# Patient Record
Sex: FEMALE | Race: WHITE | NOT HISPANIC OR LATINO | Employment: UNEMPLOYED | URBAN - METROPOLITAN AREA
[De-identification: names, ages, dates, MRNs, and addresses within clinical notes are randomized per-mention and may not be internally consistent; named-entity substitution may affect disease eponyms.]

---

## 2018-05-07 DIAGNOSIS — Z96.649 STATUS POST REVISION OF TOTAL HIP REPLACEMENT: Primary | ICD-10-CM

## 2018-05-07 NOTE — TELEPHONE ENCOUNTER
FUTURE VISIT INFORMATION      FUTURE VISIT INFORMATION:    Date: 5/10/18    Time: 1315    Location: ORTHO  REFERRAL INFORMATION:    Referring provider:  SELF    Referring providers clinic:  NONE    Reason for visit/diagnosis  L HIP    RECORDS RECEIVED FROM: INTERNAL   DATE RECEIVED: 5/7/18   NOTES STATUS DETAILS   OFFICE NOTE from referring provider N/A    OFFICE NOTE from other specialist Internal 6/5/14*8/5/13*6/10/13*11/19/12*   DISCHARGE SUMMARY from hospital N/A    DISCHARGE REPORT from the ER N/A    OPERATIVE REPORT Internal    MEDICATION LIST Internal    IMPLANT RECORD/STICKER Internal    LABS     CBC/DIFF Internal    CULTURES N/A    INJECTIONS DONE IN RADIOLOGY N/A    MRI N/A    CT SCAN N/A    XRAYS (IMAGES & REPORTS) Internal 6/5/14*8/5/13*6/11/13*2/17/13*   /TUMOR     PATHOLOGY  Slides & report N/A

## 2018-05-10 ENCOUNTER — PRE VISIT (OUTPATIENT)
Dept: ORTHOPEDICS | Facility: CLINIC | Age: 48
End: 2018-05-10

## 2018-05-10 ENCOUNTER — RADIANT APPOINTMENT (OUTPATIENT)
Dept: GENERAL RADIOLOGY | Facility: CLINIC | Age: 48
End: 2018-05-10
Attending: ORTHOPAEDIC SURGERY
Payer: COMMERCIAL

## 2018-05-10 ENCOUNTER — OFFICE VISIT (OUTPATIENT)
Dept: ORTHOPEDICS | Facility: CLINIC | Age: 48
End: 2018-05-10
Payer: COMMERCIAL

## 2018-05-10 VITALS — HEIGHT: 67 IN | BODY MASS INDEX: 31.61 KG/M2 | WEIGHT: 201.4 LBS

## 2018-05-10 DIAGNOSIS — Z96.649 STATUS POST REVISION OF TOTAL HIP REPLACEMENT: ICD-10-CM

## 2018-05-10 DIAGNOSIS — M93.001 SLIPPED PROXIMAL FEMORAL EPIPHYSIS OF RIGHT HIP: ICD-10-CM

## 2018-05-10 DIAGNOSIS — S73.005S DISLOCATION OF LEFT HIP, SEQUELA: Primary | ICD-10-CM

## 2018-05-10 RX ORDER — HYDROCORTISONE 2.5 %
CREAM (GRAM) TOPICAL
COMMUNITY
Start: 2018-03-07

## 2018-05-10 RX ORDER — BUPROPION HYDROCHLORIDE 150 MG/1
150 TABLET ORAL
COMMUNITY
Start: 2018-03-07

## 2018-05-10 ASSESSMENT — ACTIVITIES OF DAILY LIVING (ADL)
ADL_MEAN: 1.05
ADL_SUM: 18
ADL_SUBSCALE_SCORE: 73.52

## 2018-05-10 ASSESSMENT — ENCOUNTER SYMPTOMS
DEPRESSION: 1
MUSCLE WEAKNESS: 0
PANIC: 0
JOINT SWELLING: 1
NERVOUS/ANXIOUS: 0
MUSCLE CRAMPS: 1
DECREASED CONCENTRATION: 0
NECK PAIN: 1
ARTHRALGIAS: 1
STIFFNESS: 1
MYALGIAS: 1
BACK PAIN: 1
INSOMNIA: 1

## 2018-05-10 ASSESSMENT — HOOS S4: HOW SEVERE IS YOUR HIP JOINT STIFFNESS AFTER FIRST WAKENING IN THE MORNING?: MILD

## 2018-05-10 NOTE — PROGRESS NOTES
Orthopaedic Oncology and Adult Reconstructive (joint replacement) Surgery   Clinic visit -  Glen Waldron M.D.    DX:   1. SCFE Bilateral femoral heads   2. Recurrent dislocation L ERLINDA    SURGERIES:   1. 1988, L prox femoral osteotomy, Dr. Jose Rafael Seay. [facility]  2. 1989, Removal of hardware  3. 1992, 2 level lumbar fusion, DR. Mccall  4. 1993, Removal hardware L spine  5. 11/10/1995, L ERLINDA, Dr. Seay, [facility]  6. 4/20/2011, R knee scope, Dr. Miguel Ángel Martin  7. 10/7/2012, 10/16/12, 10/25/12 L Hip dislocation. CLosed REductions  8. 11/7/2012, negative ultrasound Bilateral LE's  9. 6/11/2013, Revision ERLINDA for recurrent dislocation. IMPLANTS USED: Biomet size 24, 32-mm inner diameter high wall vitamin E impregnated acetabular liner for size 56-mm outer diameter cup. Biolox Zirconia Delta option ceramic head, 32 mm diameter with type 16/18 taper with type 12/14 taper adapter titanium sleeve. For reference purposes, size 9 femoral stem porous ingrowth Biomet Bi-Metric device was implanted with a type 12/14 Levine taper. Intraop cultures (-).    HISTORY OF PRESENT ILLNESS: Mago Haney returns to see me and I last saw her approximately 4-5 years ago for evaluation of her revised left hip arthroplasty.  She has been under the care of Dr. Miguel Ángel Martin and is considering a right hip or right knee replacement.  She reports that she is doing well with respect to her left hip.  She has lost over 100 pounds since I last saw her after she underwent a gastric sleeve bariatric procedure.     PHYSICAL EXAMINATION: She is independently ambulatory.  There is no limp.  She has a pain-free range of motion of both hips.  There is a leg length discrepancy being shorter on the right side.      IMAGING:  We reviewed x-rays obtained in our office today which shows well seated implants with no evidence of loosening or subsidence.  No bearing wear is appreciated.  A deformed right femoral head is noted in part related to her prior slipped  capital femoral epiphyses.  A short neck and a varus position is noted.     PLAN: I believe her outcome is excellent considering the problems she is experiencing.  We have been successful in alleviating the recurrent dislocation and was occurring her left hip joint.  She had questions about whether or not to proceed the right hip or knee replacement. I do not have right knee films available for review.  However I indicated that sometimes diagnostic lidocaine injections into the knee will help elucidate what component of her pain is related to knee arthritis as opposed to what component of pain is originating from the hip joint.  I suggested she bring this up with Dr. Martin.     Next follow-up visit in 5 years.    MD Azar James Family Professor  Oncology and Adult Reconstructive Surgery  Dept Orthopaedic Surgery, [facility]  [phone] office, [phone] pager  [email or web address]    Total Time = 25 min, 50% of which was spent in counseling and coordination of care as documented above.        Questionnaires:  HOOS Hip Dysfunction & Osteoarthritis Outcome Questionnaire     Hip Dysfunction & Osteoarthritis Outcome Score (HOOS), English Version LK 2.0 (Lina PAYTON, Attila E, Brenna CISNEROS., 2003) 5/10/2018   S1. Do you feel grinding, hear clicking or any other type of noise from your hip? Never   S2. Difficulties spreading legs wide apart Mild   S3. Difficulties to stride out when walking Mild   S4. How severe is your hip joint stiffness after first wakening in the morning? Mild   S5. How severe is your hip stiffness after sitting, lying or resting LATER IN THE DAY? Mild   Symptom Count 5   Symptom Sum 4   Symptom Mean 0.8   Symptom Subscale Score 80   P1. How often is your hip painful? Monthly   P2. Straightening your hip fully None   P3. Bending your hip FULLY None   P4. Walking on a flat surface None   P5. Going up or down stairs Mild   P6. At night while in bed None   P7. Sitting or lying None    P8. Standing upright None   P9. Walking on a hard surface (asphalt, concrete, etc.) Mild   P10. Walking on an uneven surface Moderate   Pain Count 10   Pain Sum 5   Pain Mean 0.5   Pain Subscale Score 87.5   A1. Descending stairs Mild   A2. Ascending stairs Mild   A3. Rising from sitting Mild   A4. Standing Mild   A5. Bending to the floor/ an object Mild   A6. Walking on a flat surface Mild   A7. Getting in/out of car Mild   A8. Going shopping Mild   A9. Putting on socks/stockings Mild   A10. Rising from bed Mild   A11. Taking off socks/stockings Mild   A12. Lying in bed (turning over, maintaining hip position) Mild   A13. Getting in/out of bed Mild   A14. Sitting Mild   A15. Getting on/off toilet Mild   A16. Heavy domestic duties (moving heavy boxes, scrubbing floors, etc.) Moderate   A17. Light domestic duties (cooking, dusting, etc.) Mild   ADL Count 17   ADL Sum 18   ADL Mean 1.05   ADL Subscale Score 73.52   SP1. Squatting Severe   SP2. Running Severe   SP3. Twisting/pivoting on loaded leg Severe   SP4. Walking on uneven surface Moderate   Sports/Rec Count 4   Sports/Rec Sum 11   Sports/Rec Mean 2.75   Sports/Rec Subscale Score 31.25   Q1. How often are you aware of your hip problem? Weekly   Q2. Have you modified you life style to avoid activities potentially damaging to your hip? Moderately   Q3. How much are you troubled with lack of confidence in your hip? Not at all   Q4. In general, how much difficulty do you have with your hip? Mild   QOL Count 4   QOL Sum 5   QOL Mean 1.25   Quality of Life Subscale Score 68.75            Promis 10 Assessment     PROMIS 10 5/10/2018   In general, would you say your health is: Good   In general, would you say your quality of life is: Good   In general, how would you rate your physical health? Fair   In general, how would you rate your mental health, including your mood and your ability to think? Good   In general, how would you rate your satisfaction with your  social activities and relationships? Good   In general, please rate how well you carry out your usual social activities and roles Good   To what extent are you able to carry out your everyday physical activities such as walking, climbing stairs, carrying groceries, or moving a chair? Moderately   How often have you been bothered by emotional problems such as feeling anxious, depressed or irritable? Sometimes   How would you rate your fatigue on average? Moderate   How would you rate your pain on average?   0 = No Pain  to  10 = Worst Imaginable Pain 7   In general, would you say your health is: 3   In general, would you say your quality of life is: 3   In general, how would you rate your physical health? 2   In general, how would you rate your mental health, including your mood and your ability to think? 3   In general, how would you rate your satisfaction with your social activities and relationships? 3   In general, please rate how well you carry out your usual social activities and roles. (This includes activities at home, at work and in your community, and responsibilities as a parent, child, spouse, employee, friend, etc.) 3   To what extent are you able to carry out your everyday physical activities such as walking, climbing stairs, carrying groceries, or moving a chair? 3   In the past 7 days, how often have you been bothered by emotional problems such as feeling anxious, depressed, or irritable? 3   In the past 7 days, how would you rate your fatigue on average? 3   In the past 7 days, how would you rate your pain on average, where 0 means no pain, and 10 means worst imaginable pain? 7   Global Mental Health Score 12   Global Physical Health Score 10   PROMIS TOTAL - SUBSCORES 22       Answers for HPI/ROS submitted by the patient on 5/10/2018   General Symptoms: No  Skin Symptoms: No  HENT Symptoms: No  EYE SYMPTOMS: No  HEART SYMPTOMS: No  LUNG SYMPTOMS: No  INTESTINAL SYMPTOMS: No  URINARY SYMPTOMS:  No  GYNECOLOGIC SYMPTOMS: No  BREAST SYMPTOMS: No  SKELETAL SYMPTOMS: Yes  BLOOD SYMPTOMS: No  NERVOUS SYSTEM SYMPTOMS: No  MENTAL HEALTH SYMPTOMS: Yes  Back pain: Yes  Muscle aches: Yes  Neck pain: Yes  Swollen joints: Yes  Joint pain: Yes  Bone pain: Yes  Muscle cramps: Yes  Muscle weakness: No  Joint stiffness: Yes  Bone fracture: No  Nervous or Anxious: No  Depression: Yes  Trouble sleeping: Yes  Trouble thinking or concentrating: No  Mood changes: No  Panic attacks: No

## 2018-05-10 NOTE — NURSING NOTE
"Chief Complaint   Patient presents with     Surgical Followup     5yr POP F/u Left Hip Exam Under Anesthesia DOS: 06/11/2013.        48 year old  1970    Ht 1.689 m (5' 6.5\")  Wt 91.4 kg (201 lb 6.4 oz)  BMI 32.02 kg/m2        [id number] - [place], [place] - [address]    Allergies   Allergen Reactions     Codeine Phosphate Nausea     Tylox [Oxycodone-Acetaminophen] Nausea     Prevacid      Caused ulcerative colitis     Erythromycin      Yeast infection     Morphine Sulfate      Not effective     Current Outpatient Prescriptions   Medication     ACETAMINOPHEN PO     albuterol (PROVENTIL HFA: VENTOLIN HFA) 108 (90 BASE) MCG/ACT inhaler     buPROPion (WELLBUTRIN XL) 150 MG 24 hr tablet     calcium carbonate-vitamin D 500-400 MG-UNIT TABS per tablet     cholecalciferol 1000 UNITS TABS     Cyanocobalamin 5000 MCG SUBL     diphenhydrAMINE (BENADRYL) 25 MG capsule     DULoxetine (CYMBALTA) 30 MG capsule     HYDROcodone-acetaminophen (NORCO) 5-325 MG per tablet     hydrocortisone 2.5 % cream     levonorgestrel (MIRENA, 52 MG,) 20 MCG/24HR IUD     LISINOPRIL PO     mometasone-formoterol (DULERA) 100-5 MCG/ACT oral inhaler     Multiple Vitamins-Minerals (CENTRUM SILVER) per tablet     Multiple Vitamins-Minerals (MULTIVITAMINS PLUS ZINC PO)     norethindrone (KIRSTEN) 0.35 MG tablet     orphenadrine (NORFLEX) 100 MG tablet     oxaprozin (DAYPRO) 600 MG tablet     oxybutynin (DITROPAN) 5 MG tablet     Pantoprazole Sodium (PROTONIX PO)     polyethylene glycol (MIRALAX/GLYCOLAX) packet     SIMVASTATIN PO     traZODone (DESYREL) 50 MG tablet     No current facility-administered medications for this visit.      Facility-Administered Medications Ordered in Other Visits   Medication     lidocaine 0.5 % injection         Questionnaires:  HOOS Hip Dysfunction & Osteoarthritis Outcome Questionnaire    Hip Dysfunction & Osteoarthritis Outcome Score (HOOS), English Version LK 2.0 (Attila Ramos Mannevik E., " 2003) 5/10/2018   S1. Do you feel grinding, hear clicking or any other type of noise from your hip? Never   S2. Difficulties spreading legs wide apart Mild   S3. Difficulties to stride out when walking Mild   S4. How severe is your hip joint stiffness after first wakening in the morning? Mild   S5. How severe is your hip stiffness after sitting, lying or resting LATER IN THE DAY? Mild   Symptom Count 5   Symptom Sum 4   Symptom Mean 0.8   Symptom Subscale Score 80   P1. How often is your hip painful? Monthly   P2. Straightening your hip fully None   P3. Bending your hip FULLY None   P4. Walking on a flat surface None   P5. Going up or down stairs Mild   P6. At night while in bed None   P7. Sitting or lying None   P8. Standing upright None   P9. Walking on a hard surface (asphalt, concrete, etc.) Mild   P10. Walking on an uneven surface Moderate   Pain Count 10   Pain Sum 5   Pain Mean 0.5   Pain Subscale Score 87.5   A1. Descending stairs Mild   A2. Ascending stairs Mild   A3. Rising from sitting Mild   A4. Standing Mild   A5. Bending to the floor/ an object Mild   A6. Walking on a flat surface Mild   A7. Getting in/out of car Mild   A8. Going shopping Mild   A9. Putting on socks/stockings Mild   A10. Rising from bed Mild   A11. Taking off socks/stockings Mild   A12. Lying in bed (turning over, maintaining hip position) Mild   A13. Getting in/out of bed Mild   A14. Sitting Mild   A15. Getting on/off toilet Mild   A16. Heavy domestic duties (moving heavy boxes, scrubbing floors, etc.) Moderate   A17. Light domestic duties (cooking, dusting, etc.) Mild   ADL Count 17   ADL Sum 18   ADL Mean 1.05   ADL Subscale Score 73.52   SP1. Squatting Severe   SP2. Running Severe   SP3. Twisting/pivoting on loaded leg Severe   SP4. Walking on uneven surface Moderate   Sports/Rec Count 4   Sports/Rec Sum 11   Sports/Rec Mean 2.75   Sports/Rec Subscale Score 31.25   Q1. How often are you aware of your hip problem? Weekly    Q2. Have you modified you life style to avoid activities potentially damaging to your hip? Moderately   Q3. How much are you troubled with lack of confidence in your hip? Not at all   Q4. In general, how much difficulty do you have with your hip? Mild   QOL Count 4   QOL Sum 5   QOL Mean 1.25   Quality of Life Subscale Score 68.75          Promis 10 Assessment    PROMIS 10 5/10/2018   In general, would you say your health is: Good   In general, would you say your quality of life is: Good   In general, how would you rate your physical health? Fair   In general, how would you rate your mental health, including your mood and your ability to think? Good   In general, how would you rate your satisfaction with your social activities and relationships? Good   In general, please rate how well you carry out your usual social activities and roles Good   To what extent are you able to carry out your everyday physical activities such as walking, climbing stairs, carrying groceries, or moving a chair? Moderately   How often have you been bothered by emotional problems such as feeling anxious, depressed or irritable? Sometimes   How would you rate your fatigue on average? Moderate   How would you rate your pain on average?   0 = No Pain  to  10 = Worst Imaginable Pain 7   In general, would you say your health is: 3   In general, would you say your quality of life is: 3   In general, how would you rate your physical health? 2   In general, how would you rate your mental health, including your mood and your ability to think? 3   In general, how would you rate your satisfaction with your social activities and relationships? 3   In general, please rate how well you carry out your usual social activities and roles. (This includes activities at home, at work and in your community, and responsibilities as a parent, child, spouse, employee, friend, etc.) 3   To what extent are you able to carry out your everyday physical activities  such as walking, climbing stairs, carrying groceries, or moving a chair? 3   In the past 7 days, how often have you been bothered by emotional problems such as feeling anxious, depressed, or irritable? 3   In the past 7 days, how would you rate your fatigue on average? 3   In the past 7 days, how would you rate your pain on average, where 0 means no pain, and 10 means worst imaginable pain? 7   Global Mental Health Score 12   Global Physical Health Score 10   PROMIS TOTAL - SUBSCORES 22   Some recent data might be hidden

## 2018-05-10 NOTE — MR AVS SNAPSHOT
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 With Smallknot, you can request a clinic appointment, read your test results, renew a prescription or communicate with your care team.     To sign up for Smallknot visit the website at [email or web address]   You will be asked to enter the access code listed below, as well as some personal information. Please follow the directions to create your username and password.     Your access code is: [email or web address]  Expires: 2018  6:30 AM     Your access code will  in 90 days. If you need help or a new code, please contact your [facility] or call [phone] for assistance.        Care EveryWhere ID     This is your Care EveryWhere ID. This could be used by other organizations to access your Left Hand medical records  [id number]        Your Vitals Were     Height BMI (Body Mass Index)                1.689 m (5' 6.5\") 32.02 kg/m2           Blood Pressure from Last 3 Encounters:   06/15/13 123/63   12 124/54    Weight from Last 3 Encounters:   05/10/18 91.4 kg (201 lb 6.4 oz)   14 102.1 kg (225 lb)   13 (!) 141.3 kg (311 lb 8 oz)              Today, " you had the following     No orders found for display       Primary Care Provider Office Phone # Fax #    Marley Barragan [phone]       [facility] [address] [address]        Equal Access to Services     WALLY KELLY : Hadii josiane crews sachao Soedvinali, waaxda luqadaha, qaybta kaalmada adeegyada, burt dianain hayaagennaro qureshi sylviacornelius garcia. So [facility] [phone].    ATENCIÓN: Si habla español, tiene a davis disposición servicios gratuitos de asistencia lingüística. Llame al [phone].    We comply with applicable federal civil rights laws and [place] laws. We do not discriminate on the basis of race, color, national origin, age, disability, sex, sexual orientation, or gender identity.            Thank you!     Thank you for choosing [facility]  for your care. Our goal is always to provide you with excellent care. Hearing back from our patients is one way we can continue to improve our services. Please take a few minutes to complete the written survey that you may receive in the mail after your visit with us. Thank you!             Your Updated Medication List - Protect others around you: Learn how to safely use, store and throw away your medicines at www.disposemymeds.org.          This list is accurate as of 5/10/18  2:25 PM.  Always use your most recent med list.                   Brand Name Dispense Instructions for use Diagnosis    ACETAMINOPHEN PO      Take 500 mg by mouth every 6 hours as needed.        albuterol 108 (90 Base) MCG/ACT Inhaler    PROAIR HFA/PROVENTIL HFA/VENTOLIN HFA     Inhale 2 puffs into the lungs every 6 hours as needed.        BENADRYL 25 MG capsule   Generic drug:  diphenhydrAMINE      Take 25 mg by mouth 2 times daily.        buPROPion 150 MG 24 hr tablet    WELLBUTRIN XL     Take 150 mg by mouth        calcium carbonate-vitamin D 500-400 MG-UNIT Tabs per tablet           KIRSTEN 0.35 MG per tablet   Generic drug:   norethindrone      Take 1 tablet by mouth daily.        * CENTRUM SILVER per tablet      Take 1 tablet by mouth daily.        * MULTIVITAMINS PLUS ZINC PO           cholecalciferol 1000 units Tabs     100 tablet    Take 1,000 Units by mouth daily.    Hip pain, left, Subluxation of prosthetic hip (H)       Cyanocobalamin 5000 MCG Subl           CYMBALTA 30 MG EC capsule   Generic drug:  DULoxetine      Take 30 mg by mouth 2 times daily        HYDROcodone-acetaminophen 5-325 MG per tablet    NORCO     Take 1 tablet by mouth every 6 hours as needed for moderate to severe pain        hydrocortisone 2.5 % cream           LISINOPRIL PO      Take 20 mg by mouth daily.        MIRENA (52 MG) 20 MCG/24HR IUD   Generic drug:  levonorgestrel           mometasone-formoterol 100-5 MCG/ACT oral inhaler    DULERA     Inhale 2 puffs into the lungs        orphenadrine 100 MG 12 hr tablet    NORFLEX     Take 1 tablet by mouth At Bedtime.    S/P hip replacement       oxaprozin 600 MG tablet    DAYPRO     Take 600 mg by mouth 3 times daily. Two in am, one in pm        oxybutynin 5 MG tablet    DITROPAN    270 tablet    Take 1 tablet by mouth 2 times daily.    GERD (gastroesophageal reflux disease)       polyethylene glycol Packet    MIRALAX/GLYCOLAX    14 each    Take 17 g by mouth daily.    Hip pain, left, Subluxation of prosthetic hip (H)       PROTONIX PO      Take 40 mg by mouth 2 times daily (before meals).        SIMVASTATIN PO      Take 40 mg by mouth At Bedtime.        traZODone 50 MG tablet    DESYREL     Take  by mouth At Bedtime.        * Notice:  This list has 2 medication(s) that are the same as other medications prescribed for you. Read the directions carefully, and ask your doctor or other care provider to review them with you.

## 2018-05-10 NOTE — LETTER
5/10/2018       RE: Mago Danielson  [address]  [facility] [address]     Dear Colleague,    Thank you for referring your patient, Mago Danielson, to the [facility] ORTHOP[facility]ED[facility] at [facility]. Please see a copy of my visit note below.        Orthopaedic Oncology and Adult Reconstructive (joint replacement) Surgery   Clinic visit -  Glne Waldron M.D.    DX:   1. SCFE Bilateral femoral heads   2. Recurrent dislocation L ERLINDA    SURGERIES:   1. 1988, L prox femoral osteotomy, Dr. Jose Rafael Seay. [facility]  2. 1989, Removal of hardware  3. 1992, 2 level lumbar fusion, DR. Mccall  4. 1993, Removal hardware L spine  5. 11/10/1995, L ERLINDA, Dr. Seay, [facility]  6. 4/20/2011, R knee scope, Dr. Miguel Ángel Martin  7. 10/7/2012, 10/16/12, 10/25/12 L Hip dislocation. CLosed REductions  8. 11/7/2012, negative ultrasound Bilateral LE's  9. 6/11/2013, Revision ERLINDA for recurrent dislocation. IMPLANTS USED: Biomet size 24, 32-mm inner diameter high wall vitamin E impregnated acetabular liner for size 56-mm outer diameter cup. Biolox Zirconia Delta option ceramic head, 32 mm diameter with type 16/18 taper with type 12/14 taper adapter titanium sleeve. For reference purposes, size 9 femoral stem porous ingrowth Biomet Bi-Metric device was implanted with a type 12/14 Levine taper. Intraop cultures (-).    HISTORY OF PRESENT ILLNESS: Mago Haney returns to see me and I last saw her approximately 4-5 years ago for evaluation of her revised left hip arthroplasty.  She has been under the care of Dr. Miguel Ángel Martin and is considering a right hip or right knee replacement.  She reports that she is doing well with respect to her left hip.  She has lost over 100 pounds since I last saw her after she underwent a gastric sleeve bariatric procedure.     PHYSICAL EXAMINATION: She is independently ambulatory.  There is no limp.  She has a pain-free range of motion of both hips.  There is a leg length  discrepancy being shorter on the right side.      IMAGING:  We reviewed x-rays obtained in our office today which shows well seated implants with no evidence of loosening or subsidence.  No bearing wear is appreciated.  A deformed right femoral head is noted in part related to her prior slipped capital femoral epiphyses.  A short neck and a varus position is noted.     PLAN: I believe her outcome is excellent considering the problems she is experiencing.  We have been successful in alleviating the recurrent dislocation and was occurring her left hip joint.  She had questions about whether or not to proceed the right hip or knee replacement. I do not have right knee films available for review.  However I indicated that sometimes diagnostic lidocaine injections into the knee will help elucidate what component of her pain is related to knee arthritis as opposed to what component of pain is originating from the hip joint.  I suggested she bring this up with Dr. Martin.     Next follow-up visit in 5 years.    MD Azar James Family Professor  Oncology and Adult Reconstructive Surgery  Dept Orthopaedic Surgery, [facility]  [phone] office, [phone] pager  [email or web address]    Total Time = 25 min, 50% of which was spent in counseling and coordination of care as documented above.        Questionnaires:  HOOS Hip Dysfunction & Osteoarthritis Outcome Questionnaire     Hip Dysfunction & Osteoarthritis Outcome Score (HOOS), English Version LK 2.0 (Attila Ramos E, Brenna CISNEROS., 2003) 5/10/2018   S1. Do you feel grinding, hear clicking or any other type of noise from your hip? Never   S2. Difficulties spreading legs wide apart Mild   S3. Difficulties to stride out when walking Mild   S4. How severe is your hip joint stiffness after first wakening in the morning? Mild   S5. How severe is your hip stiffness after sitting, lying or resting LATER IN THE DAY? Mild   Symptom Count 5   Symptom Sum 4    Symptom Mean 0.8   Symptom Subscale Score 80   P1. How often is your hip painful? Monthly   P2. Straightening your hip fully None   P3. Bending your hip FULLY None   P4. Walking on a flat surface None   P5. Going up or down stairs Mild   P6. At night while in bed None   P7. Sitting or lying None   P8. Standing upright None   P9. Walking on a hard surface (asphalt, concrete, etc.) Mild   P10. Walking on an uneven surface Moderate   Pain Count 10   Pain Sum 5   Pain Mean 0.5   Pain Subscale Score 87.5   A1. Descending stairs Mild   A2. Ascending stairs Mild   A3. Rising from sitting Mild   A4. Standing Mild   A5. Bending to the floor/ an object Mild   A6. Walking on a flat surface Mild   A7. Getting in/out of car Mild   A8. Going shopping Mild   A9. Putting on socks/stockings Mild   A10. Rising from bed Mild   A11. Taking off socks/stockings Mild   A12. Lying in bed (turning over, maintaining hip position) Mild   A13. Getting in/out of bed Mild   A14. Sitting Mild   A15. Getting on/off toilet Mild   A16. Heavy domestic duties (moving heavy boxes, scrubbing floors, etc.) Moderate   A17. Light domestic duties (cooking, dusting, etc.) Mild   ADL Count 17   ADL Sum 18   ADL Mean 1.05   ADL Subscale Score 73.52   SP1. Squatting Severe   SP2. Running Severe   SP3. Twisting/pivoting on loaded leg Severe   SP4. Walking on uneven surface Moderate   Sports/Rec Count 4   Sports/Rec Sum 11   Sports/Rec Mean 2.75   Sports/Rec Subscale Score 31.25   Q1. How often are you aware of your hip problem? Weekly   Q2. Have you modified you life style to avoid activities potentially damaging to your hip? Moderately   Q3. How much are you troubled with lack of confidence in your hip? Not at all   Q4. In general, how much difficulty do you have with your hip? Mild   QOL Count 4   QOL Sum 5   QOL Mean 1.25   Quality of Life Subscale Score 68.75            Promis 10 Assessment     PROMIS 10 5/10/2018   In general, would you say your  health is: Good   In general, would you say your quality of life is: Good   In general, how would you rate your physical health? Fair   In general, how would you rate your mental health, including your mood and your ability to think? Good   In general, how would you rate your satisfaction with your social activities and relationships? Good   In general, please rate how well you carry out your usual social activities and roles Good   To what extent are you able to carry out your everyday physical activities such as walking, climbing stairs, carrying groceries, or moving a chair? Moderately   How often have you been bothered by emotional problems such as feeling anxious, depressed or irritable? Sometimes   How would you rate your fatigue on average? Moderate   How would you rate your pain on average?   0 = No Pain  to  10 = Worst Imaginable Pain 7   In general, would you say your health is: 3   In general, would you say your quality of life is: 3   In general, how would you rate your physical health? 2   In general, how would you rate your mental health, including your mood and your ability to think? 3   In general, how would you rate your satisfaction with your social activities and relationships? 3   In general, please rate how well you carry out your usual social activities and roles. (This includes activities at home, at work and in your community, and responsibilities as a parent, child, spouse, employee, friend, etc.) 3   To what extent are you able to carry out your everyday physical activities such as walking, climbing stairs, carrying groceries, or moving a chair? 3   In the past 7 days, how often have you been bothered by emotional problems such as feeling anxious, depressed, or irritable? 3   In the past 7 days, how would you rate your fatigue on average? 3   In the past 7 days, how would you rate your pain on average, where 0 means no pain, and 10 means worst imaginable pain? 7   Global Mental Health Score  12   Global Physical Health Score 10   PROMIS TOTAL - SUBSCORES 22       Again, thank you for allowing me to participate in the care of your patient.      Sincerely,    Glen Waldron MD

## 2022-01-01 ENCOUNTER — PATIENT OUTREACH (OUTPATIENT)
Dept: CARE COORDINATION | Facility: CLINIC | Age: 52
End: 2022-01-01

## 2022-01-01 ENCOUNTER — VIRTUAL VISIT (OUTPATIENT)
Dept: CONSULT | Facility: CLINIC | Age: 52
End: 2022-01-01
Attending: PSYCHIATRY & NEUROLOGY
Payer: MEDICARE

## 2022-01-01 ENCOUNTER — TELEPHONE (OUTPATIENT)
Dept: NEUROLOGY | Facility: CLINIC | Age: 52
End: 2022-01-01
Payer: MEDICARE

## 2022-01-01 ENCOUNTER — TRANSFERRED RECORDS (OUTPATIENT)
Dept: HEALTH INFORMATION MANAGEMENT | Facility: CLINIC | Age: 52
End: 2022-01-01

## 2022-01-01 ENCOUNTER — TELEPHONE (OUTPATIENT)
Dept: NEUROLOGY | Facility: CLINIC | Age: 52
End: 2022-01-01

## 2022-01-01 ENCOUNTER — OFFICE VISIT (OUTPATIENT)
Dept: NEUROLOGY | Facility: CLINIC | Age: 52
End: 2022-01-01
Payer: MEDICARE

## 2022-01-01 ENCOUNTER — RESEARCH ENCOUNTER (OUTPATIENT)
Dept: NEUROLOGY | Facility: CLINIC | Age: 52
End: 2022-01-01

## 2022-01-01 ENCOUNTER — TRANSCRIBE ORDERS (OUTPATIENT)
Dept: OTHER | Age: 52
End: 2022-01-01
Payer: MEDICARE

## 2022-01-01 ENCOUNTER — DOCUMENTATION ONLY (OUTPATIENT)
Dept: NEUROLOGY | Facility: CLINIC | Age: 52
End: 2022-01-01

## 2022-01-01 ENCOUNTER — HEALTH MAINTENANCE LETTER (OUTPATIENT)
Age: 52
End: 2022-01-01

## 2022-01-01 ENCOUNTER — LAB (OUTPATIENT)
Dept: LAB | Facility: CLINIC | Age: 52
End: 2022-01-01

## 2022-01-01 ENCOUNTER — VIRTUAL VISIT (OUTPATIENT)
Dept: NEUROLOGY | Facility: CLINIC | Age: 52
End: 2022-01-01
Payer: MEDICARE

## 2022-01-01 VITALS
RESPIRATION RATE: 16 BRPM | WEIGHT: 207.5 LBS | BODY MASS INDEX: 32.99 KG/M2 | HEART RATE: 72 BPM | DIASTOLIC BLOOD PRESSURE: 81 MMHG | OXYGEN SATURATION: 96 % | SYSTOLIC BLOOD PRESSURE: 123 MMHG

## 2022-01-01 VITALS
DIASTOLIC BLOOD PRESSURE: 72 MMHG | HEART RATE: 72 BPM | SYSTOLIC BLOOD PRESSURE: 105 MMHG | WEIGHT: 225 LBS | RESPIRATION RATE: 16 BRPM | BODY MASS INDEX: 35.77 KG/M2 | OXYGEN SATURATION: 96 %

## 2022-01-01 DIAGNOSIS — G12.21 ALS (AMYOTROPHIC LATERAL SCLEROSIS) (H): Primary | ICD-10-CM

## 2022-01-01 DIAGNOSIS — R53.1 WEAKNESS: Primary | ICD-10-CM

## 2022-01-01 DIAGNOSIS — G12.21 ALS (AMYOTROPHIC LATERAL SCLEROSIS) (H): ICD-10-CM

## 2022-01-01 DIAGNOSIS — M75.02 ADHESIVE CAPSULITIS OF LEFT SHOULDER: ICD-10-CM

## 2022-01-01 DIAGNOSIS — G12.20 MOTOR NEURON DISEASE (H): ICD-10-CM

## 2022-01-01 LAB
ALBUMIN SERPL-MCNC: 3.9 G/DL (ref 3.4–5)
ALP SERPL-CCNC: 71 U/L (ref 40–150)
ALT SERPL W P-5'-P-CCNC: 16 U/L (ref 0–50)
ALT SERPL-CCNC: 24 U/L (ref 14–63)
AST SERPL W P-5'-P-CCNC: 18 U/L (ref 0–45)
AST SERPL-CCNC: 18 U/L (ref 15–37)
BASOPHILS # BLD AUTO: 0.1 10E3/UL (ref 0–0.2)
BASOPHILS NFR BLD AUTO: 1 %
BILIRUB DIRECT SERPL-MCNC: 0.1 MG/DL (ref 0–0.2)
BILIRUB SERPL-MCNC: 0.3 MG/DL (ref 0.2–1.3)
EOSINOPHIL # BLD AUTO: 0.2 10E3/UL (ref 0–0.7)
EOSINOPHIL NFR BLD AUTO: 4 %
ERYTHROCYTE [DISTWIDTH] IN BLOOD BY AUTOMATED COUNT: 14.3 % (ref 10–15)
EXPTIME-PRE: 6.09 SEC
EXPTIME-PRE: 9.46 SEC
FEF2575-%PRED-PRE: 121 %
FEF2575-%PRED-PRE: 131 %
FEF2575-PRE: 3.31 L/SEC
FEF2575-PRE: 3.6 L/SEC
FEF2575-PRED: 2.73 L/SEC
FEF2575-PRED: 2.73 L/SEC
FEFMAX-%PRED-PRE: 102 %
FEFMAX-%PRED-PRE: 85 %
FEFMAX-PRE: 6.02 L/SEC
FEFMAX-PRE: 7.16 L/SEC
FEFMAX-PRED: 7.02 L/SEC
FEFMAX-PRED: 7.02 L/SEC
FEV1-%PRED-PRE: 101 %
FEV1-%PRED-PRE: 105 %
FEV1-PRE: 2.96 L
FEV1-PRE: 3.07 L
FEV1FEV6-PRE: 84 %
FEV1FEV6-PRE: 85 %
FEV1FEV6-PRED: 82 %
FEV1FEV6-PRED: 82 %
FEV1FVC-PRE: 83 %
FEV1FVC-PRE: 85 %
FEV1FVC-PRED: 80 %
FEV1FVC-PRED: 80 %
FIFMAX-PRE: 3.63 L/SEC
FIFMAX-PRE: 5.86 L/SEC
FVC-%PRED-PRE: 97 %
FVC-%PRED-PRE: 97 %
FVC-PRE: 3.57 L
FVC-PRE: 3.6 L
FVC-PRED: 3.68 L
FVC-PRED: 3.68 L
HCT VFR BLD AUTO: 39.4 % (ref 35–47)
HGB BLD-MCNC: 12.9 G/DL (ref 11.7–15.7)
IMM GRANULOCYTES # BLD: 0 10E3/UL
IMM GRANULOCYTES NFR BLD: 0 %
LYMPHOCYTES # BLD AUTO: 2.2 10E3/UL (ref 0.8–5.3)
LYMPHOCYTES NFR BLD AUTO: 38 %
MCH RBC QN AUTO: 32.8 PG (ref 26.5–33)
MCHC RBC AUTO-ENTMCNC: 32.7 G/DL (ref 31.5–36.5)
MCV RBC AUTO: 100 FL (ref 78–100)
MEP-PRE: 78 CMH2O
MEP-PRE: 91 CMH2O
MIP-PRE: -75 CMH2O
MIP-PRE: -77 CMH2O
MONOCYTES # BLD AUTO: 0.5 10E3/UL (ref 0–1.3)
MONOCYTES NFR BLD AUTO: 9 %
NEUTROPHILS # BLD AUTO: 2.8 10E3/UL (ref 1.6–8.3)
NEUTROPHILS NFR BLD AUTO: 48 %
NRBC # BLD AUTO: 0 10E3/UL
NRBC BLD AUTO-RTO: 0 /100
PLATELET # BLD AUTO: 318 10E3/UL (ref 150–450)
PROT SERPL-MCNC: 6.9 G/DL (ref 6.8–8.8)
RBC # BLD AUTO: 3.93 10E6/UL (ref 3.8–5.2)
WBC # BLD AUTO: 5.7 10E3/UL (ref 4–11)

## 2022-01-01 PROCEDURE — 94375 RESPIRATORY FLOW VOLUME LOOP: CPT | Performed by: INTERNAL MEDICINE

## 2022-01-01 PROCEDURE — 36415 COLL VENOUS BLD VENIPUNCTURE: CPT | Performed by: PATHOLOGY

## 2022-01-01 PROCEDURE — 85025 COMPLETE CBC W/AUTO DIFF WBC: CPT | Performed by: PATHOLOGY

## 2022-01-01 PROCEDURE — 99214 OFFICE O/P EST MOD 30 MIN: CPT | Mod: 95 | Performed by: PSYCHIATRY & NEUROLOGY

## 2022-01-01 PROCEDURE — 99417 PROLNG OP E/M EACH 15 MIN: CPT | Performed by: PSYCHIATRY & NEUROLOGY

## 2022-01-01 PROCEDURE — 99214 OFFICE O/P EST MOD 30 MIN: CPT | Performed by: PSYCHIATRY & NEUROLOGY

## 2022-01-01 PROCEDURE — 80076 HEPATIC FUNCTION PANEL: CPT | Performed by: PATHOLOGY

## 2022-01-01 PROCEDURE — 96040 HC GENETIC COUNSELING, EACH 30 MINUTES: CPT | Mod: GT | Performed by: GENETIC COUNSELOR, MS

## 2022-01-01 PROCEDURE — 99212 OFFICE O/P EST SF 10 MIN: CPT | Mod: 95 | Performed by: PSYCHIATRY & NEUROLOGY

## 2022-01-01 PROCEDURE — 99205 OFFICE O/P NEW HI 60 MIN: CPT | Performed by: PSYCHIATRY & NEUROLOGY

## 2022-01-01 RX ORDER — LISINOPRIL 10 MG/1
1 TABLET ORAL DAILY
COMMUNITY
Start: 2022-01-01

## 2022-01-01 RX ORDER — EDARAVONE 105 MG/5ML
KIT ORAL
Qty: 150 ML | Refills: 1 | Status: SHIPPED | OUTPATIENT
Start: 2022-01-01

## 2022-01-01 RX ORDER — BUSPIRONE HYDROCHLORIDE 7.5 MG/1
7.5 TABLET ORAL 2 TIMES DAILY
COMMUNITY
Start: 2022-01-01

## 2022-01-01 RX ORDER — OXYCODONE HYDROCHLORIDE 15 MG/1
1 TABLET, FILM COATED, EXTENDED RELEASE ORAL SEE ADMIN INSTRUCTIONS
COMMUNITY
Start: 2022-01-01

## 2022-01-01 RX ORDER — RILUZOLE 50 MG/1
50 TABLET, FILM COATED ORAL EVERY 12 HOURS
Qty: 60 TABLET | Refills: 3 | Status: SHIPPED | OUTPATIENT
Start: 2022-01-01 | End: 2022-01-01

## 2022-01-01 RX ORDER — RILUZOLE 50 MG/1
50 TABLET, FILM COATED ORAL EVERY 12 HOURS
Qty: 60 TABLET | Refills: 3 | Status: SHIPPED | OUTPATIENT
Start: 2022-01-01

## 2022-01-01 RX ORDER — POLYETHYLENE GLYCOL 3350 17 G/17G
17 POWDER, FOR SOLUTION ORAL DAILY PRN
COMMUNITY
Start: 2022-01-01

## 2022-01-01 RX ORDER — EDARAVONE 105 MG/5ML
105 KIT ORAL DAILY
Qty: 210 ML | Refills: 3 | Status: SHIPPED | OUTPATIENT
Start: 2022-01-01 | End: 2022-01-01

## 2022-01-01 RX ORDER — ACETAMINOPHEN 500 MG
2 TABLET ORAL 2 TIMES DAILY PRN
COMMUNITY
Start: 2022-01-01

## 2022-01-01 RX ORDER — DOCUSATE SODIUM AND SENNOSIDES 8.6; 5 MG/1; MG/1
2 TABLET, FILM COATED ORAL 2 TIMES DAILY
COMMUNITY
Start: 2022-01-01

## 2022-01-01 RX ORDER — LISINOPRIL 10 MG/1
10 TABLET ORAL DAILY
COMMUNITY
Start: 2022-01-01

## 2022-01-01 RX ORDER — PANTOPRAZOLE SODIUM 40 MG/1
40 TABLET, DELAYED RELEASE ORAL
COMMUNITY
Start: 2021-06-18

## 2022-01-01 RX ORDER — ASPIRIN 325 MG
2 TABLET ORAL DAILY
COMMUNITY
Start: 2022-01-01

## 2022-01-01 ASSESSMENT — ENCOUNTER SYMPTOMS
NERVOUS/ANXIOUS: 0
TROUBLE SWALLOWING: 0
CHANGE IN APPETITE: NORMAL
ABNORMAL BEHAVIOR: 0
DEPRESSION: 0
DIFFICULTY FALLING ASLEEP: 0
DIFFICULTY WITH CONCENTRATION: 0

## 2022-01-01 ASSESSMENT — PAIN SCALES - GENERAL
PAINLEVEL: EXTREME PAIN (9)
PAINLEVEL: SEVERE PAIN (7)

## 2022-05-17 NOTE — TELEPHONE ENCOUNTER
[facility] Call Center    Phone Message    May a detailed message be left on voicemail: yes     Reason for Call: Appointment Intake    Referring Provider Name: Lilian Baker   Diagnosis and/or Symptoms: Weakness  Motor neuron disease     Per our protocols, motor neuron disease needs to be reviewed and scheduled by the clinic.    Please review and contact the patient to schedule.    Action Taken: Message routed to:  [facility] ([facility] Neurology    Travel Screening: Not Applicable

## 2022-05-18 NOTE — TELEPHONE ENCOUNTER
[facility] Call Center    Phone Message    May a detailed message be left on voicemail: yes     Reason for Call: Other: Pt is calling to talk with Nurse Kristan. Pt says she just spoke with her a little while ago and they discussed 2 appts. Pt is scheduled to see Dr. Olmos on 05/25 and Paty mentioned Thursday as well. Pt is wondering if she is supposed to be seeing Dr. Olmos 05/25 and 05/26 or is the Thursday appt for next week. She needs to know because her  will be bringing her to the appt and he needs to give enough notice to his job. Pt needs clarification and would like Paty to give her a call back.      Ph: [phone]     Action Taken: Message routed to:  [facility] Neurology    Travel Screening: Not Applicable

## 2022-05-25 NOTE — LETTER
2022       RE: Mago Danielson  [address]  [place] [address]     Dear Colleague,    Thank you for referring your patient, Mago Danielson, to the [facility] NEUROLOGY [facility]INIC [place] at [facility]. Please see a copy of my visit note below.    Service Date: 2022    Lilian Alves MD  [facility]  [address] [place]  [address]    RE:  Mago Danielson  MRN:  [id number]  :  1970    Dear Doctors:    I met with Mago Danielson and her  today for further evaluation of a recent diagnosis of ALS.  Mrs. Danielson is a 52-year-old woman who reports falling backwards without warning in 2021.  She presumed this was related to instability of the left knee which required a total knee replacement.  She did undergo a total knee replacement in 2021 and did well in rehabilitation until 2021 when she began to feel as though her left lower extremity was weakening.  Prior to that she had been walking independently or with a cane only.  At about that time, she also felt that the top of the left thigh felt numb to pressure but was not fully insensate.  Not long thereafter, she noted her left ankle rolling followed by weakness of the left upper extremity and increasing difficulty walking such that she needed to use a walker or cane.  Now she is able to walk only short distances only or transfer with assistance.  Associated symptoms include cramping in the lower extremities and urinary urgency.  She denies any positive or negative sensory symptoms beyond those noted above.  She denies dysarthria, dysphagia, cognitive or behavioral change, orthopnea, dyspnea on exertion or difficulty using her nondominant right upper limb.  Her weight is stable or increased.  Of note, she underwent a gastric sleeve procedure many years ago and lost 120 pounds.  She has since gained back 40-50 of those.  She does take multivitamin  supplements as well as vitamin B12.  She does not take vitamin B6 per se.    Mrs. Danielson lives with her .  She does not use alcohol and does not smoke.  She is not a  .  She has 9 siblings and no children.  One of her siblings has the MADSAM variant of CIDP.  One of her siblings has cerebral palsy and another has childhood onset deformities attributed to Liliane syndrome.  Her mother passed away at age 88 with cardiac disease and was diabetic.  She had no neurological problems.  Her mother had 5 siblings, one of whom had ALS.  Her father is living and has diabetes.    PHYSICAL EXAM:  The patient is a moderately overweight but otherwise healthy-appearing woman seated in a wheelchair.  Speech, language and affect are appropriate.  She does endorse some sadness because of her recent diagnosis of ALS.  It is appropriate and she denies a history of psychiatric disease or thoughts of self harm.  Pupils are equal, round, reactive to light.  Extraocular movements are full.  There is no ptosis.  Orbicularis oculi strength is normal.  Pterygoid strength is normal.  There is no jaw jerk.  Rapid labial, lingual and guttural sounds are produced without difficulty.  Tongue bulk, coordination and strength are normal.  Neck flexion and extension strength are normal.  Perception of light touch is preserved throughout.  Perception of vibration is 4 and 3 seconds at the right and left great toes, respectively.  Testing to pinprick perception was diminished at the left great toe but otherwise generally within normal limits.  Motor examination demonstrates increased tone in the left lower extremity.  Rapid repetitive movements are performed very slowly throughout, least so in the right upper limb.  Manual muscle testing demonstrates full strength except as follows, right/left:  Shoulder abduction 5/4+, elbow extension, 4+/4, wrist extension 5/4+, finger extension 4/3, digit spread 4/4, thumb abduction 4/4-, hip  flexion 4+/4, ankle dorsiflexion 4/4.  Reflexes are 3+ throughout the upper limbs and at the patellae.  There is spread to finger flexors in bilateral upper limbs with bilateral Pascale signs as well.  Achilles reflexes are 2+ and, if anything, may be somewhat more difficult to obtain than anticipated because of increased tone, particularly in the left lower limb as noted above.  Plantar responses are equivocally upgoing on the right and tonically upgoing on the left without substantial change with plantar stimulation.  She is nonambulatory.    MEDICAL RECORD REVIEW:  Two electrodiagnostic studies performed in February and May of this year demonstrated fibrillation potentials and neurogenic motor unit changes with widespread fasciculation potentials.  The initial study demonstrated findings principally in the tibial and peroneal innervated distal lower limb muscles, while her more recent study demonstrated more widespread changes involving upper and lower limbs.  Sensory nerve action potential amplitudes were normal.  Some motor amplitude potentials were attenuated.  There was no meaningful conduction slowing and no focal motor conduction block.  Imaging of the cervical, thoracic and lumbar spine demonstrates no notable findings.  Imaging of the brain demonstrates a nonenhancing small left hemispheric subcortical lesion and is otherwise unremarkable.    I explained the following to Mrs. Danielson and her :  I concur with the diagnosis of ALS.  I reviewed the diagnostic algorithm in some detail and pointed out the presence of unequivocal upper and lower motor neuron findings in upper and lower limbs without a plausible alternative explanation and with progression of disability.  I next discussed the natural history and management of ALS in detail.  I did recommend that she begin on riluzole and we will complete a benefits investigation form for oral edaravone.  We discussed clinical trials at length and she may  have some interest, although distance may be a barrier.  We discussed the natural history including ventilatory management and management options in end-stage ALS.  I recommended genetic testing and we will arrange this as well and I provided the rationale for doing so.  We discussed symptom management at some length.  She is currently working with both occupational and physical therapy and understands that the focus of the work should be on function and safety with exercises designed principally to maintain range of motion and function and not for strengthening.  We had an extensive discussion of durable medical equipment needs.  She will obtain a left ankle foot orthosis and would be a candidate for a power wheelchair.  We will be in contact with our multidisciplinary care team as well.  We discussed consideration of a return to our multidisciplinary care clinic, recognizing that she is already working with 2 social workers, occupational and physical therapy, and other resources locally.  She has been in contact with the ALS Association as well.  I will discuss her needs in our multidisciplinary care meeting and will arrange a followup telehealth visit in 1 month.  She will be contacted by telephone by our research coordinator and genetic counselor.  She may benefit from a separate visit for a fitting for a power wheelchair or, alternatively, a visit from the vendor with a virtual visit with an ATP to obtain the appropriate fittings.  We also discussed the benefit of baclofen.  I would defer this for now as she will be staring on other medications and may do best with stretching.  As her hepatic function panel today was normal, I sent in a prescription for riluzole and we will do periodic laboratory monitoring as well.    Sincerely,    Andrzej Olmos MD      cc:  [place] [address]      90 minutes spent on the date of the encounter on chart review,  history and examination, documentation and further activities as noted above.      D: 2022   T: 2022   MT: fer    Name:     HERNAN ADAMPam  MRN:      [id number]        Account:      [id number]   :      1970           Service Date: 2022       Document: [id number]

## 2022-05-25 NOTE — Clinical Note
Please see patient instructions; also notify her that her labs are normal and I have sent in a riluzole prescription.

## 2022-05-25 NOTE — PATIENT INSTRUCTIONS
I agree with the diagnosis of ALS. We discussed the management and prognosis of ALS today.  Labs today. If normal, I will start riluzole.  I will begin the benefits investigation process for radicava.  We will hold off on baclofen for now. Stretching is helpful for the stiffness.  Consider a pain management referral, depending upon what Dr Barragan thinks.  We will need to arrange follow up labs locally.  Our genetic counselor and research coordinators will call you.   Paty will arrange a virtual visit in about one month.  Online resources:  alsa.org  mdausa.org  als.net  clinicaltrials.gov    Please call Paty @ [phone] for questions or concerns during regular business hours. For a more efficient way to communicate, use [employer] and address the message to your physician. Remember, [employer] is only read during business hours. Do not leave urgent messages on voicemail or [employer]. If situation is urgent, contact the Neurology Clinic @ [phone] and ask to speak to a Triage Nurse or Call 911 or visit an Emergency Department.     Please call your pharmacy if you need a medication refill. They will send us an electronic message.

## 2022-05-25 NOTE — Clinical Note
Sandy, please arrange a visit with Candida arana ALS panel. Katt, please contact the patient about clinical trials. Thanks.

## 2022-05-26 NOTE — PROGRESS NOTES
Service Date: 2022    Lilian Alves MD  [facility]  [address] [place]  [address]    RE:  Mago Danielson  MRN:  [id number]  :  1970    Dear Doctors:    I met with Mago Danielson and her  today for further evaluation of a recent diagnosis of ALS.  Mrs. Danielson is a 52-year-old woman who reports falling backwards without warning in 2021.  She presumed this was related to instability of the left knee which required a total knee replacement.  She did undergo a total knee replacement in 2021 and did well in rehabilitation until 2021 when she began to feel as though her left lower extremity was weakening.  Prior to that she had been walking independently or with a cane only.  At about that time, she also felt that the top of the left thigh felt numb to pressure but was not fully insensate.  Not long thereafter, she noted her left ankle rolling followed by weakness of the left upper extremity and increasing difficulty walking such that she needed to use a walker or cane.  Now she is able to walk only short distances only or transfer with assistance.  Associated symptoms include cramping in the lower extremities and urinary urgency.  She denies any positive or negative sensory symptoms beyond those noted above.  She denies dysarthria, dysphagia, cognitive or behavioral change, orthopnea, dyspnea on exertion or difficulty using her nondominant right upper limb.  Her weight is stable or increased.  Of note, she underwent a gastric sleeve procedure many years ago and lost 120 pounds.  She has since gained back 40-50 of those.  She does take multivitamin supplements as well as vitamin B12.  She does not take vitamin B6 per se.    Mrs. Danielson lives with her .  She does not use alcohol and does not smoke.  She is not a  .  She has 9 siblings and no children.  One of her siblings has the MADSAM variant of CIDP.  One of her siblings has cerebral palsy and  another has childhood onset deformities attributed to Liliane syndrome.  Her mother passed away at age 88 with cardiac disease and was diabetic.  She had no neurological problems.  Her mother had 5 siblings, one of whom had ALS.  Her father is living and has diabetes.    PHYSICAL EXAM:  The patient is a moderately overweight but otherwise healthy-appearing woman seated in a wheelchair.  Speech, language and affect are appropriate.  She does endorse some sadness because of her recent diagnosis of ALS.  It is appropriate and she denies a history of psychiatric disease or thoughts of self harm.  Pupils are equal, round, reactive to light.  Extraocular movements are full.  There is no ptosis.  Orbicularis oculi strength is normal.  Pterygoid strength is normal.  There is no jaw jerk.  Rapid labial, lingual and guttural sounds are produced without difficulty.  Tongue bulk, coordination and strength are normal.  Neck flexion and extension strength are normal.  Perception of light touch is preserved throughout.  Perception of vibration is 4 and 3 seconds at the right and left great toes, respectively.  Testing to pinprick perception was diminished at the left great toe but otherwise generally within normal limits.  Motor examination demonstrates increased tone in the left lower extremity.  Rapid repetitive movements are performed very slowly throughout, least so in the right upper limb.  Manual muscle testing demonstrates full strength except as follows, right/left:  Shoulder abduction 5/4+, elbow extension, 4+/4, wrist extension 5/4+, finger extension 4/3, digit spread 4/4, thumb abduction 4/4-, hip flexion 4+/4, ankle dorsiflexion 4/4.  Reflexes are 3+ throughout the upper limbs and at the patellae.  There is spread to finger flexors in bilateral upper limbs with bilateral Pascale signs as well.  Achilles reflexes are 2+ and, if anything, may be somewhat more difficult to obtain than anticipated because of increased  tone, particularly in the left lower limb as noted above.  Plantar responses are equivocally upgoing on the right and tonically upgoing on the left without substantial change with plantar stimulation.  She is nonambulatory.    MEDICAL RECORD REVIEW:  Two electrodiagnostic studies performed in February and May of this year demonstrated fibrillation potentials and neurogenic motor unit changes with widespread fasciculation potentials.  The initial study demonstrated findings principally in the tibial and peroneal innervated distal lower limb muscles, while her more recent study demonstrated more widespread changes involving upper and lower limbs.  Sensory nerve action potential amplitudes were normal.  Some motor amplitude potentials were attenuated.  There was no meaningful conduction slowing and no focal motor conduction block.  Imaging of the cervical, thoracic and lumbar spine demonstrates no notable findings.  Imaging of the brain demonstrates a nonenhancing small left hemispheric subcortical lesion and is otherwise unremarkable.    I explained the following to Mrs. Danielson and her :  I concur with the diagnosis of ALS.  I reviewed the diagnostic algorithm in some detail and pointed out the presence of unequivocal upper and lower motor neuron findings in upper and lower limbs without a plausible alternative explanation and with progression of disability.  I next discussed the natural history and management of ALS in detail.  I did recommend that she begin on riluzole and we will complete a benefits investigation form for oral edaravone.  We discussed clinical trials at length and she may have some interest, although distance may be a barrier.  We discussed the natural history including ventilatory management and management options in end-stage ALS.  I recommended genetic testing and we will arrange this as well and I provided the rationale for doing so.  We discussed symptom management at some length.  She  is currently working with both occupational and physical therapy and understands that the focus of the work should be on function and safety with exercises designed principally to maintain range of motion and function and not for strengthening.  We had an extensive discussion of durable medical equipment needs.  She will obtain a left ankle foot orthosis and would be a candidate for a power wheelchair.  We will be in contact with our multidisciplinary care team as well.  We discussed consideration of a return to our multidisciplinary care clinic, recognizing that she is already working with 2 social workers, occupational and physical therapy, and other resources locally.  She has been in contact with the ALS Association as well.  I will discuss her needs in our multidisciplinary care meeting and will arrange a followup telehealth visit in 1 month.  She will be contacted by telephone by our research coordinator and genetic counselor.  She may benefit from a separate visit for a fitting for a power wheelchair or, alternatively, a visit from the vendor with a virtual visit with an ATP to obtain the appropriate fittings.  We also discussed the benefit of baclofen.  I would defer this for now as she will be staring on other medications and may do best with stretching.  As her hepatic function panel today was normal, I sent in a prescription for riluzole and we will do periodic laboratory monitoring as well.    Sincerely,      Andrzej Olmos MD  cc:  [place] [address]    Andrzej Olmos MD    90 minutes spent on the date of the encounter on chart review, history and examination, documentation and further activities as noted above.      D: 2022   T: 2022   MT: fer    Name:     HERNAN ADAM  MRN:      [id number]        Account:      [id number]   :      1970           Service Date: 2022       Document: [id number]

## 2022-06-27 NOTE — TELEPHONE ENCOUNTER
PA Initiation    Medication: Radicava ORS Starter Kit 105MG/5ML suspension (PA PENDING)  Insurance Company: [employer] - Phone [phone] Fax [phone]  Pharmacy Filling the Rx: [facility] - [place], [place] - [address]  Filling Pharmacy Phone:    Filling Pharmacy Fax:    Start Date: 6/27/2022        Thank you,    Tejal Jackson [facility]  Specialty Pharmacy Clinic Liaison - CardiologyNeurologyMultiple [facility] [address]  [address] [place] [address]  Ph: [phone] Fax: [phone]  [email or web address]

## 2022-06-29 NOTE — TELEPHONE ENCOUNTER
Prior Authorization Approval    Authorization Effective Date: 6/27/2022  Authorization Expiration Date: 6/27/2023  Medication: Radicava ORS Starter Kit 105MG/5ML suspension (PA APPROVED)  Approved Dose/Quantity: 28 days  Reference #:     Insurance Company: [employer] - Phone [phone] Fax [phone]  Expected CoPay:       CoPay Card Available: No    Foundation Assistance Needed: [employer] [facility] (miroslava open as of 6/27/22)  Which Pharmacy is filling the prescription (Not needed for infusion/clinic administered): [facility] - [place], [place] - [address] [place] [address]  Pharmacy Notified:    Patient Notified:        PA approved for Radicava. The expected cost at this time is $0. The [employer] start form was faxed to [facility] along with PA approval. I believe [facility] is the preferred pharmacy.        Thank you,    Tejal Jackson [facility]  Specialty Pharmacy Clinic Liaison - CardiologyNeurologyMultiple Sclerosis  [facility] [address]  [address] [place] [address]  Ph: [phone] Fax: [phone]  [email or web address]

## 2022-07-05 NOTE — PROGRESS NOTES
Mago Danielson  is being evaluated via a billable video visit.      How would you like to obtain your AVS? [email or web address]  For the video visit, send the invitation by: Text to cell phone: [phone]  Will anyone else be joining your video visit? Earnestine Crowe VF

## 2022-07-05 NOTE — LETTER
Date:July 13, 2022      Provider requested that no letter be sent. Do not send.       [facility]

## 2022-07-05 NOTE — LETTER
7/5/2022      RE: Mago Danielson  [address]  [place] [address]     Dear Colleague,    Thank you for the opportunity to participate in the care of your patient, Mago Danielson, at the [facility] at [facility]. Please see a copy of my visit note below.    Balbina Danielson was seen for a genetic counseling appointment at the request of Dr. Olmos today given her diagnosis of ALS. She was assisted at today's appointment by her  Basilio.    Pertinent Medical History: Mago is a 52 year old female with a history of ALS. See Dr. Olmos's note for additional details.     Family History: A three generation pedigree was obtained today and scanned into the EMR. This family history is by patient report only and has not been verified with medical records except where noted. The following information is significant:     Balbina does not have children.    Balbina has six brothers and three sisters. Her brother Andrzej (age 67) has type 2 diabetes, high blood pressure, three healthy children, one child with a heart problem ad one child with cerebral palsy. Balbina's sister Jeff (age 64) has arthritis, one healthy daughter and one daughter with fibromyalgia. Balbina's brother Misbah (age 62) has type 2 diabetes, high cholesterol, high blood pressure and no children. Balbina's brother Michael (age 61) is alive and well and has four healthy children. Balbina's brother Shayne (age 60) has a history of bipolar disorder, type 2 diabetes and no children. Balbina's brother Dm (age 57) is alive and well and has one son and one daughter who have been wheelchair users since birth and one son who has oppositional defiance disorder. Balbina's sister Reba (age 55) is alive and well and has one son with Asperger's syndrome and one daughter who is alive and well. Balbina's sister Saba (age 51) has a history of a sarcoma under her arm and in her lungs that was diagnosed in her  20s. She is s/p surgery. She currently has CIDP that began in 2018, weakness in her legs and right arm and two healthy sons.Balbina's brother Amish (age 47) is alive and well and has one daughter with a heart murmur and one son with schizo affective disorder and a history of lyme disease.     Balbina's father (age 94) has type 2 diabetes but is otherwise healthy. Balbina's paternal aunts, uncles and cousins have no history of neurologic concerns. Balbina's paternal grandfather  due to Alzheimer's disease. Balbina's paternal grandmother had no history of neurologic concerns. Paternal ancestry is Occ[place].     Balbina's mother  at age 88 due to unknown causes and had a history of type two diabetes and heart problems. She has one brother who  in his late 80s due to ALS that was diagnosed in his late 80s. He had one son who  in an accident, one son who is alive and well and three daughters who are alive and well. All of Balbina's other maternal aunts, uncles and cousins have no history of neurologic concerns. Balbina's maternal grandfather  due to a heart attack and had no history of neurologic concerns. Balbina's maternal grandmother is  and has no history of neurologic concerns. Maternal ancestry is [place].    Family history is otherwise negative for ALS, Dementia, and Parkinson's disease. Consanguinity was denied.    Discussion: Amyotrophic lateral sclerosis (ALS) is a condition that affects the motor neurons.  Motor neurons are responsible for sending the necessary signals to the muscles, to allow for movement and speech. In ALS, these motor neurons break down and eventually lead to loss of speech and paralysis. Some individuals with ALS may also experience neuropsychological symptoms such as cognitive and/or behavioral dysfunction or frontotemporal dementia (FTD) in severe cases. The progression of this condition is approximately three to five years, although it can vary with age of onset and between and within  "families. Historically, ALS has been categorized into \"familial ALS\" when an individual has two or more close relatives with ALS and \"sporadic ALS\" when an individual with ALS has no family history of the condition. ALS is not generally thought of as a genetic or inherited condition; however recently several genetic factors have been found to be either causative or contributory. When a family history of ALS is identified then we have a strong suspicion that there is a causative genetic factor or genetic change (mutation) that predisposes members of the family to ALS. However, as more research and genetic testing has become available, individuals with seemingly sporadic ALS can have a gene mutation identified. This may be due to a new (de susan) mutation in that individual or reduced penetrance. Therefore, a negative family history cannot rule out a possible genetic form of ALS. About 10-15% of individuals with ALS are thought to have \"genetic ALS\" meaning a causative gene mutation has been identified.     There are several genes that have been found to be associated with ALS and/or FTD. The most common genetic cause of ALS is a hexanucleotide repeat expansion (GGGGCC) in the L9brp87 gene. The number of hexanucleotide repeats in the W0ejj83 gene ranges from 2 to >4000. The precise cutoff of between normal and pathogenic (disease causing) is complicated by multiple factors, but generally <25 is considered normal and >60 is considered pathogenic. Repeat expansions in the R0xyb39 gene account for approximately 39-45% of familial cases of ALS and about 3-7% of sporadic ALS. Other genes known to cause ALS include: SOD1, FUS, TARDBP, ANG, OPTN, FIG4, VAPB, UBQLN2, SETX, NEK1, VCP, ALS2, TDP43 (CHCHD10, DCTN1, MATR3, PFN1, TUBA4A, UNC13A, ATXN2).    Genetic forms of ALS are typically inherited in an autosomal dominant pattern, meaning a change on one copy of the gene is sufficient to predispose an individual to the " condition. Someone who possesses an ALS gene mutation would have a 1 in 2 (50%) chance of passing the gene mutation associated with ALS on to their children. It is import to know that not all people who inherit an ALS gene mutation will develop ALS. This is called reduced penetrance. For example, the penetrance or percent of individuals who develop ALS who have a mutation in SOD1 ranges from 50% to 90% by age 70, depending on the specific genetic mutation. It is assumed that other genes will also show reduced penetrance. We discussed that if the gene associated with ALS is NOT passed on; that child is NOT at an increased risk to develop symptoms because of this genetic change and CANNOT pass it on to their children. The only way to know if the genetic change is passed on is by genetic testing.    We discussed the availability of genetic testing for ALS. The gold standard of ALS genetic testing is to begin testing the C5zbk14 gene to determine if an individual has a normal number of hexanucleotide repeats (<25) or an expanded number of hexanucleotide repeats (>60). If that testing identifies normal repeats on both copies of H9ief60, then testing can be expanded to a multi-gene panel to include the other genes associated with ALS. We went on to discuss the details, limitations, and possible outcomes of these multi-gene panels. In particular, we discussed that there are three possible results:    Negative: meaning normal or no mutations are identified in the genes that were tested/sequenced    Positive: meaning a mutation that is known to be associated with a particular set of symptoms is identified    Variant of uncertain significance (VUS): meaning a change in the DNA sequence of a particular gene was seen but there is not enough information or data yet to know if it explains the symptoms. If a VUS is identified, testing of other relatives may be helpful to provide clarification.  In most cases, identification of a  VUS does not confirm a diagnosis and does not result in any clinically actionable recommendations.    Even with the growing numbers of genes that have been identified, current clinically available genetic testing identifies a causative mutation in less than 40% families affected with familial ALS. Therefore, most of the time we cannot identify the genetic cause in individuals with ALS. If the results of genetic testing are negative, this cannot rule out the possibility that there may be an underlying genetic cause or component to an individual's ALS, as it is likely that the genetic cause of all forms of ALS have yet to be discovered. DNA banking is the process in which we can store an individual's DNA almost indefinitely to test at a later time when new genetic tests are available. Additionally, the DNA could be used in research with the participant's consent. This can be done for any genetic condition. It can also be done for conditions which are not thought to be genetic or where there isn't a known family history. Saving the sample may allow for testing as advances are made in diagnosis and treatment.  DNA banking is available through many laboratories including a laboratory called [facility]. Additional information is available upon request.    We reviewed the option to complete genetic testing through the sponsored or non sponsored ALS panel with [id number] analysis at [facility] pending insurance approval. Risks benefits and limitations of each of these options were reviewed. Balbina expressed an excellent understanding of this information and decided to proceed with the sponsored ALS panel.    Plan:  1. Sponsored ALS panel with [id number] analysis at [facility]   2. Return pending results of above testing  3. Contact information was provided should any questions arise in the future.     Candida Hendrickson [facility]  Genetic Counselor  Division of Genetics and Metabolism  (p) [phone]  (f)  [phone]     Total time spent in consultation with the family was approximately 40 minutes    Cc: No Letter      Mago Danielson  is being evaluated via a billable video visit.      How would you like to obtain your AVS? [email or web address]  For the video visit, send the invitation by: Text to cell phone: [phone]  Will anyone else be joining your video visit? Earnestine SIMONS      Please do not hesitate to contact me if you have any questions/concerns.     Sincerely,       Candida Hendrickson GC

## 2022-07-05 NOTE — PROGRESS NOTES
Balbina Danielson was seen for a genetic counseling appointment at the request of Dr. Olmos today given her diagnosis of ALS. She was assisted at today's appointment by her  Basilio.    Pertinent Medical History: Mago is a 52 year old female with a history of ALS. See Dr. Olmos's note for additional details.     Family History: A three generation pedigree was obtained today and scanned into the EMR. This family history is by patient report only and has not been verified with medical records except where noted. The following information is significant:     Balbina does not have children.    Balbina has six brothers and three sisters. Her brother Andrzej (age 67) has type 2 diabetes, high blood pressure, three healthy children, one child with a heart problem ad one child with cerebral palsy. Balbina's sister Jeff (age 64) has arthritis, one healthy daughter and one daughter with fibromyalgia. Balbina's brother Misbah (age 62) has type 2 diabetes, high cholesterol, high blood pressure and no children. Balbina's brother Michael (age 61) is alive and well and has four healthy children. Balbina's brother Shayne (age 60) has a history of bipolar disorder, type 2 diabetes and no children. Balbina's brother Dm (age 57) is alive and well and has one son and one daughter who have been wheelchair users since birth and one son who has oppositional defiance disorder. Balbina's sister Reba (age 55) is alive and well and has one son with Asperger's syndrome and one daughter who is alive and well. Balbina's sister Saba (age 51) has a history of a sarcoma under her arm and in her lungs that was diagnosed in her 20s. She is s/p surgery. She currently has CIDP that began in 2018, weakness in her legs and right arm and two healthy sons.Balbina's brother Amish (age 47) is alive and well and has one daughter with a heart murmur and one son with schizo affective disorder and a history of lyme disease.     Balbina's father (age 94) has type 2 diabetes but is otherwise healthy.  "Balbina's paternal aunts, uncles and cousins have no history of neurologic concerns. Balbina's paternal grandfather  due to Alzheimer's disease. Balbina's paternal grandmother had no history of neurologic concerns. Paternal ancestry is [place].     Balbina's mother  at age 88 due to unknown causes and had a history of type two diabetes and heart problems. She has one brother who  in his late 80s due to ALS that was diagnosed in his late 80s. He had one son who  in an accident, one son who is alive and well and three daughters who are alive and well. All of Balbina's other maternal aunts, uncles and cousins have no history of neurologic concerns. Balbina's maternal grandfather  due to a heart attack and had no history of neurologic concerns. Balbina's maternal grandmother is  and has no history of neurologic concerns. Maternal ancestry is [place].    Family history is otherwise negative for ALS, Dementia, and Parkinson's disease. Consanguinity was denied.    Discussion: Amyotrophic lateral sclerosis (ALS) is a condition that affects the motor neurons.  Motor neurons are responsible for sending the necessary signals to the muscles, to allow for movement and speech. In ALS, these motor neurons break down and eventually lead to loss of speech and paralysis. Some individuals with ALS may also experience neuropsychological symptoms such as cognitive and/or behavioral dysfunction or frontotemporal dementia (FTD) in severe cases. The progression of this condition is approximately three to five years, although it can vary with age of onset and between and within families. Historically, ALS has been categorized into \"familial ALS\" when an individual has two or more close relatives with ALS and \"sporadic ALS\" when an individual with ALS has no family history of the condition. ALS is not generally thought of as a genetic or inherited condition; however recently several genetic factors have been found to be either causative " "or contributory. When a family history of ALS is identified then we have a strong suspicion that there is a causative genetic factor or genetic change (mutation) that predisposes members of the family to ALS. However, as more research and genetic testing has become available, individuals with seemingly sporadic ALS can have a gene mutation identified. This may be due to a new (de susan) mutation in that individual or reduced penetrance. Therefore, a negative family history cannot rule out a possible genetic form of ALS. About 10-15% of individuals with ALS are thought to have \"genetic ALS\" meaning a causative gene mutation has been identified.     There are several genes that have been found to be associated with ALS and/or FTD. The most common genetic cause of ALS is a hexanucleotide repeat expansion (GGGGCC) in the P3mvc79 gene. The number of hexanucleotide repeats in the P5mfq22 gene ranges from 2 to >4000. The precise cutoff of between normal and pathogenic (disease causing) is complicated by multiple factors, but generally <25 is considered normal and >60 is considered pathogenic. Repeat expansions in the J8mhz85 gene account for approximately 39-45% of familial cases of ALS and about 3-7% of sporadic ALS. Other genes known to cause ALS include: SOD1, FUS, TARDBP, ANG, OPTN, FIG4, VAPB, UBQLN2, SETX, NEK1, VCP, ALS2, TDP43 (CHCHD10, DCTN1, MATR3, PFN1, TUBA4A, UNC13A, ATXN2).    Genetic forms of ALS are typically inherited in an autosomal dominant pattern, meaning a change on one copy of the gene is sufficient to predispose an individual to the condition. Someone who possesses an ALS gene mutation would have a 1 in 2 (50%) chance of passing the gene mutation associated with ALS on to their children. It is import to know that not all people who inherit an ALS gene mutation will develop ALS. This is called reduced penetrance. For example, the penetrance or percent of individuals who develop ALS who have a mutation " in SOD1 ranges from 50% to 90% by age 70, depending on the specific genetic mutation. It is assumed that other genes will also show reduced penetrance. We discussed that if the gene associated with ALS is NOT passed on; that child is NOT at an increased risk to develop symptoms because of this genetic change and CANNOT pass it on to their children. The only way to know if the genetic change is passed on is by genetic testing.    We discussed the availability of genetic testing for ALS. The gold standard of ALS genetic testing is to begin testing the U4qpo52 gene to determine if an individual has a normal number of hexanucleotide repeats (<25) or an expanded number of hexanucleotide repeats (>60). If that testing identifies normal repeats on both copies of G0ncu91, then testing can be expanded to a multi-gene panel to include the other genes associated with ALS. We went on to discuss the details, limitations, and possible outcomes of these multi-gene panels. In particular, we discussed that there are three possible results:    Negative: meaning normal or no mutations are identified in the genes that were tested/sequenced    Positive: meaning a mutation that is known to be associated with a particular set of symptoms is identified    Variant of uncertain significance (VUS): meaning a change in the DNA sequence of a particular gene was seen but there is not enough information or data yet to know if it explains the symptoms. If a VUS is identified, testing of other relatives may be helpful to provide clarification.  In most cases, identification of a VUS does not confirm a diagnosis and does not result in any clinically actionable recommendations.    Even with the growing numbers of genes that have been identified, current clinically available genetic testing identifies a causative mutation in less than 40% families affected with familial ALS. Therefore, most of the time we cannot identify the genetic cause in  individuals with ALS. If the results of genetic testing are negative, this cannot rule out the possibility that there may be an underlying genetic cause or component to an individual's ALS, as it is likely that the genetic cause of all forms of ALS have yet to be discovered. DNA banking is the process in which we can store an individual's DNA almost indefinitely to test at a later time when new genetic tests are available. Additionally, the DNA could be used in research with the participant's consent. This can be done for any genetic condition. It can also be done for conditions which are not thought to be genetic or where there isn't a known family history. Saving the sample may allow for testing as advances are made in diagnosis and treatment.  DNA banking is available through many laboratories including a laboratory called [facility]. Additional information is available upon request.    We reviewed the option to complete genetic testing through the sponsored or non sponsored ALS panel with [id number] analysis at [facility] pending insurance approval. Risks benefits and limitations of each of these options were reviewed. Balbina expressed an excellent understanding of this information and decided to proceed with the sponsored ALS panel.    Plan:  1. Sponsored ALS panel with [id number] analysis at [facility]   2. Return pending results of above testing  3. Contact information was provided should any questions arise in the future.     Candida Hendrickson MS [facility]  Genetic Counselor  Division of Genetics and Metabolism  p) [phone]  (f) [phone]     Total time spent in consultation with the family was approximately 40 minutes    Cc: No Letter

## 2022-07-13 NOTE — TELEPHONE ENCOUNTER
[facility] Call Center    Phone Message    May a detailed message be left on voicemail: yes     Reason for Call: Medication Refill Request    Has the patient contacted the pharmacy for the refill? Yes   Name of medication being requested: edaravone (RADICAVA ORS) 105 MG/5ML suspension  Provider who prescribed the medication: Dr. Olmos  Pharmacy: [id number] - [place], [place] - [address]  Date medication is needed: ASAP   Pt called asking for this medication  To be sent to her local Pharmacy as indicated above.      Action Taken: Message routed to:  [facility] ([facility]): Neurology    Travel Screening: Not Applicable

## 2022-07-13 NOTE — TELEPHONE ENCOUNTER
Call to patient informing that [facility] has to fill this script.  [facility] confirmed they have the medication ready to ship and patient must call to give permission. Patient aware and call-back number to [facility] provided.

## 2022-07-21 NOTE — PROGRESS NOTES
Mago is a 52 year old who is being evaluated via a billable video visit.      How would you like to obtain your AVS? MyChart  If the video visit is dropped, the invitation should be resent by: Send to e-mail at: [email or web address]  Will anyone else be joining your video visit? No        Video-Visit Details

## 2022-07-21 NOTE — LETTER
2022       RE: Mago Danielson  [address]  [place] [address]     Dear Colleague,    Thank you for referring your patient, Mago Danielson, to the [facility] at [facility]. Please see a copy of my visit note below.    Service Date: 2022    Marley Barragan MD  [facility]  [address]  [place], [place], [address]    RE:  Mago Danielson  MRN:  [id number]  :  1970    Dear Dr. Barragan:    I met with Mago Danielson and her  via virtual visit today for followup of her established diagnosis of ALS.  The visit was arranged as a video visit, but the video link was not functioning properly and we completed a telephone visit.  Mrs. Danielson and her  report some progression of functional deficits since we originally met.  They had completed physical and occupational therapy assessments and some exercises were recommended in addition to DME.  She has started Radicava ORS, but has noted restlessness at night since starting this medication.  We considered whether it may reflect a type of restless leg syndrome, but her description is somewhat different from this.  She denies any cognitive or behavioral changes.      Of note, she is on duloxetine, which can cause restless leg syndrome, but she has not changed her dose of this, and she is not on sufficient serotonergic drugs to likely cause a serotonin syndrome; furthermore, as noted above, there has been no change in other medications.  I therefore recommended that she discontinue Radicava ORS and see if symptoms resolve.  If not, she should speak with you to evaluate other possible explanations.  She does not report any symptoms to suggest nocturnal hypoventilation such as orthopnea, morning headaches, or confusion, and her forced vital capacity was normal when originally studied not long ago.    We discussed medications and research in ALS  currently.  In light of her multiple functional deficits and the potential benefit of meeting with other members of our multidisciplinary care team, we agreed that she will come to the [place] [facility] for an in-person multidisciplinary care visit.    Sincerely,    Andrzej Olmos MD    30 minutes spent on the date of the encounter on chart review, history and examination, documentation and further activities as noted above.        D: 2022   T: 2022   MTKenneth yeager    Name:     HERNAN ADAMPam  MRN:      [id number]        Account:      [id number]   :      1970           Service Date: 2022       Document: [id number]

## 2022-07-23 NOTE — PROGRESS NOTES
Service Date: 2022    Marley Barragan MD  [facility]  [address]  [place], [place], [address]    RE:  Mago Danielson  MRN:  [id number]  :  1970    Dear Dr. Barragan:    I met with Mago Danielson and her  via virtual visit today for followup of her established diagnosis of ALS.  The visit was arranged as a video visit, but the video link was not functioning properly and we completed a telephone visit.  Mrs. Danielson and her  report some progression of functional deficits since we originally met.  They had completed physical and occupational therapy assessments and some exercises were recommended in addition to DME.  She has started Radicava ORS, but has noted restlessness at night since starting this medication.  We considered whether it may reflect a type of restless leg syndrome, but her description is somewhat different from this.  She denies any cognitive or behavioral changes.      Of note, she is on duloxetine, which can cause restless leg syndrome, but she has not changed her dose of this, and she is not on sufficient serotonergic drugs to likely cause a serotonin syndrome; furthermore, as noted above, there has been no change in other medications.  I therefore recommended that she discontinue Radicava ORS and see if symptoms resolve.  If not, she should speak with you to evaluate other possible explanations.  She does not report any symptoms to suggest nocturnal hypoventilation such as orthopnea, morning headaches, or confusion, and her forced vital capacity was normal when originally studied not long ago.    We discussed medications and research in ALS currently.  In light of her multiple functional deficits and the potential benefit of meeting with other members of our multidisciplinary care team, we agreed that she will come to the [place] for an in-person multidisciplinary care visit.    Sincerely,    Andrzej Olmos MD    30 minutes spent on the date of the encounter  on chart review, history and examination, documentation and further activities as noted above.    D: 2022   T: 2022   MT: toy    Name:     HERNAN ADAM  MRN:      [id number]        Account:      [id number]   :      1970           Service Date: 2022       Document: [id number]

## 2022-08-10 NOTE — TELEPHONE ENCOUNTER
Contacted Balbina to review the results of her ALS genetic testing. The following information was reviewed:    Dear Balbina,    Thank you for allowing me to be a part of your healthcare at the [facility].  On 8/10/22 your genetic test results were discussed. As we discussed, your genetic test results did not find any disease causing or pathogenic variants. 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 This letter is a brief summary of our discussion and these genetic test results. I have also included a copy of the lab report for your records.     Our genes are sequences of letters that provide instructions that help our body grow, develop and function. We all have changes or variations in our genes that make us unique. Some variations cause the body to be unable to read the instructions. These variations are called pathogenic and can result in a genetic condition. Your genetic testing looked at many of your genes to determine if any variants or changes were present.     As we discussed by phone, this test found one variant of uncertain significance. This variant is technically called DDHD1 c.590T>C. Variants of uncertain significance are changes in our genes that may or may not cause disease. Currently we have limited information regarding whether or not these variants will impact your health. Disease causing variants in DDHD1 gene are associated with two different genetic conditions. Your genetic testing does not confirm a diagnosis of these conditions. The following information is provided for informational purposes only.    Inheritance of Genetic Conditions  We reviewed that some genetic conditions are inherited in a pattern of inheritance called autosomal recessive and some genetic conditions are inherited in a pattern called autosomal dominant.     An autosomal dominant pattern of inheritance means that a person only needs one  nonworking gene copy to show signs or symptoms of a condition and that both males and females can have the condition. If a parent has an autosomal dominant condition, there is a 50% chance with each pregnancy that they will have a child who also has this condition and a 50% chance that they will have a child who does not have this condition.     An autosomal recessive pattern of inheritance means that to be affected an individual must have a mutation in both copies of a gene (two mutations total). Individuals with just one mutation in the gene are said to be carriers. Carriers do not have symptoms but can have an affected child if their partner is also a carrier.  When both parents are carriers, with each pregnancy there is a 25% chance for the child to be affected, a 50% chance to be a carrier, and a 25% chance to be unaffected and not a carrier.      Clinical presentation of DDHD1  Disease causing changes in the DDHD1 gene are associated with a condition called autosomal recessive Hereditary spastic paraplegia. Hereditary Spastic Paraplegia (HSP) is a group of disorders that affect the upper motor nuerons. Upper motor neurons control voluntary movement and are located in the brain with long hair-like projections called axons that extend into the spinal cord.There neurons or nerves carry messages from the brain to the spinal cord. Variants or changes in one of several different genes cause damage to these neurons which makes it difficult for the messages to get from the brain to the spine and then to the muscles.     HSP is characterized by a progressive increase in muscle tone (spasticity) and reflexes (hyperreflexia) and weakness in the lower limbs. The symptoms of HSP can begin anywhere from early childhood to late adulthood and usually become worse slowly over the course of many years. HSP does not usually shorten a person's lifespan.     There are two types or presentations of HSP. These are called  pure/uncomplicated HSP and complicated/complex HSP. Mutations in DDHD2 cause uncomplicated HSP. The most common symptoms of uncomplicated HSP include spasticity (stiffness) in the legs, leg weakness, difficulty with balance, abnormal gait, bladder dysfunction, mild to moderate loss of sensation in the legs, increased reflexes, muscle spasms and narrowing of the spinal cord (spinal cord atrophy). Spasticity and weakness associated with this condition present differently in different people. Some people may experience spasticity but no weakness and others may experience both equally.Additional symptoms of DDHD1 related HSP may include scoliosis. These symptoms vary from person to person. Not everyone with HSP will have all of the symptoms described above.    Given that you must have two changes in the DDHD1 gene to be affected by an autosomal recessive condition and you only have one variant in this gene you are at most a carrier for this condition.    There is some evidence that disease causing changes in the DDHD1 gene may be associated with juvenile onset ALS. However, additional research is needed to determine if disease causing changes in this gene cause juvenile onset ALS. Individuals with this condition develop symptoms of ALS early in life.      Next Steps  At this time, we are unable to determine if the variant of uncertain significance identified is disease causing or not. However, it is unlikely that this variant is impacting your health. It is important that you continue to follow with your neurology care team for up to date medical recommendations.    Thank you again for allowing us to be a part of your care. Please do not hesitate to contact me with additional questions or concerns.    Sincerely,  Candida Hendrickson MS [facility]  Genetic Counselor  Division of Genetics and Metabolism  (p) [phone]

## 2022-08-10 NOTE — LETTER
August 10, 2022      TO: Mago Danielson  [address]  [place] [address]       Dear Balbina,    Thank you for allowing me to be a part of your healthcare at the [facility].  On 8/10/22 your genetic test results were discussed. As we discussed, your genetic test results did not find any disease causing or pathogenic variants. 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 This letter is a brief summary of our discussion and these genetic test results. I have also included a copy of the lab report for your records.     Our genes are sequences of letters that provide instructions that help our body grow, develop and function. We all have changes or variations in our genes that make us unique. Some variations cause the body to be unable to read the instructions. These variations are called pathogenic and can result in a genetic condition. Your genetic testing looked at many of your genes to determine if any variants or changes were present.     As we discussed by phone, this test found one variant of uncertain significance. This variant is technically called DDHD1 c.590T>C. Variants of uncertain significance are changes in our genes that may or may not cause disease. Currently we have limited information regarding whether or not these variants will impact your health. Disease causing variants in DDHD1 gene are associated with two different genetic conditions. Your genetic testing does not confirm a diagnosis of these conditions. The following information is provided for informational purposes only.    Inheritance of Genetic Conditions  We reviewed that some genetic conditions are inherited in a pattern of inheritance called autosomal recessive and some genetic conditions are inherited in a pattern called autosomal dominant.     An autosomal dominant pattern of inheritance means that a person only needs one nonworking gene copy to show  signs or symptoms of a condition and that both males and females can have the condition. If a parent has an autosomal dominant condition, there is a 50% chance with each pregnancy that they will have a child who also has this condition and a 50% chance that they will have a child who does not have this condition.     An autosomal recessive pattern of inheritance means that to be affected an individual must have a mutation in both copies of a gene (two mutations total). Individuals with just one mutation in the gene are said to be carriers. Carriers do not have symptoms but can have an affected child if their partner is also a carrier.  When both parents are carriers, with each pregnancy there is a 25% chance for the child to be affected, a 50% chance to be a carrier, and a 25% chance to be unaffected and not a carrier.      Clinical presentation of DDHD1  Disease causing changes in the DDHD1 gene are associated with a condition called autosomal recessive Hereditary spastic paraplegia. Hereditary Spastic Paraplegia (HSP) is a group of disorders that affect the upper motor nuerons. Upper motor neurons control voluntary movement and are located in the brain with long hair-like projections called axons that extend into the spinal cord.There neurons or nerves carry messages from the brain to the spinal cord. Variants or changes in one of several different genes cause damage to these neurons which makes it difficult for the messages to get from the brain to the spine and then to the muscles.     HSP is characterized by a progressive increase in muscle tone (spasticity) and reflexes (hyperreflexia) and weakness in the lower limbs. The symptoms of HSP can begin anywhere from early childhood to late adulthood and usually become worse slowly over the course of many years. HSP does not usually shorten a person's lifespan.     There are two types or presentations of HSP. These are called pure/uncomplicated HSP and  complicated/complex HSP. Mutations in DDHD2 cause uncomplicated HSP. The most common symptoms of uncomplicated HSP include spasticity (stiffness) in the legs, leg weakness, difficulty with balance, abnormal gait, bladder dysfunction, mild to moderate loss of sensation in the legs, increased reflexes, muscle spasms and narrowing of the spinal cord (spinal cord atrophy). Spasticity and weakness associated with this condition present differently in different people. Some people may experience spasticity but no weakness and others may experience both equally.Additional symptoms of DDHD1 related HSP may include scoliosis. These symptoms vary from person to person. Not everyone with HSP will have all of the symptoms described above.    Given that you must have two changes in the DDHD1 gene to be affected by an autosomal recessive condition and you only have one variant in this gene you are at most a carrier for this condition.    There is some evidence that disease causing changes in the DDHD1 gene may be associated with juvenile onset ALS. However, additional research is needed to determine if disease causing changes in this gene cause juvenile onset ALS. Individuals with this condition develop symptoms of ALS early in life.      Next Steps  At this time, we are unable to determine if the variant of uncertain significance identified is disease causing or not. However, it is unlikely that this variant is impacting your health. It is important that you continue to follow with your neurology care team for up to date medical recommendations.    Thank you again for allowing us to be a part of your care. Please do not hesitate to contact me with additional questions or concerns.    Sincerely,  Candida Hendrickson MS [facility]  Genetic Counselor  Division of Genetics and Metabolism  (p) [phone]

## 2022-08-18 NOTE — NURSING NOTE
Chief Complaint   Patient presents with     RECHECK     RETURN ALS/MOTOR NEURON     Charli Uribe

## 2022-08-18 NOTE — PROGRESS NOTES
Social Work -ALS Clinic Progress Note  [facility]    Data/Intervention:    Patient Name:  Mago Danielson  /Age:  1970 (52 year old)    Visit Type: in person    Collaborated With:    -Pt, spouse Basilio and niece's friend Kimberlyn Olmos and members of the ALS interdisciplinary team    Psychosocial info/Concerns:   Pt and Basilio live in a house they rent from their brother in law. They have been together 27 years and  since . They do not have children together but Basilio has adult children from a previous relationship. Pt has siblings and extended family who are involved and supportive.   She is on the CADI waiver program. She has a  Adrianna Trujillo (not sure if she is the CADI -[phone].  She has skilled home care (RN, OT, HHA) from [facility]. PCA services are thru [employer] and her husb Basilio is doing some of the PCA and they agency staff too. He has cut back at his job.   They have on-going financial concerns. She get social security disability. Basilio is using his YESENIA to pay for some monthly expenses. The CADI waiver is paying for some home modifications. They are wondering about funding for some electrical work related to having a generator back up to run her medical equipment like her hospital bed if the electricity should go out.   She also indicated that her home care RN suggested she ask about when a hospice referral would be appropriate so she can get some more services like massage therapy at home.   They don't plan to do a Will but are planning burial. Pt does not want to complete a health care directive and said her husb and her sister will make decisions for her if needed and she has talked with them about what she wants. She wants her end of life to be no nonsense. She would be interested in hospice as above when appropriate.   Mago also described all the difficulties she has had in trying to get medical transportation  with [employer]. She had a ride set up for today and no one showed up.    Intervention/Education/Resources Provided:  Reviewed their situation as above. She has appropriate services in place and good family support. I indicated that I would help next time with setting up her ride to the clinic. Will also consult with Dr Amarilis dockery whether hospice is appropriate at this time.  Suggested she has her CADI  about home delivered meals and the electrical work as that could be covered under home modifications.   Discussed how she feels she is coping. It's been difficult and she has up/down times but has managed to also find time to enjoy the things she can do and the people that visit her. She has also increased her antidepressant medication which she feels has helped. She expressed her appreciation for her husb and all he is doing.    Assessment/Plan:  Pt has some anxiety about the future as would be expected. She has good services in place at home. Will help her with transportation in the future.     Provided patient/family with contact information and encouraged them to contact me between clinic visits as needed.     Marley Marie, MSW, [facility]    [facility]  [phone]

## 2022-08-18 NOTE — PROGRESS NOTES
Service Date: 2022    Marley Barragan MD  [facility]  [address] [place] [address]    RE:  Mago Danielson  MRN:  [id number]  :  1970    Dear Dr. Barragan:    Mago Danielson returned to the [facility] [place] [facility] today for multidisciplinary care team management of ALS.  This is her first visit to our multidisciplinary clinic.  She reports relatively rapid progressive weakness and functional loss.  When I met her in May, she was using a walker and is no longer able to. She also reports worsening strength in the right upper limb.    Manual muscle testing demonstrates moderate weakness in the right upper extremity and moderate to severe weakness in the left upper extremity, roughly in the 4 to 4+ range on the right and 4 to 4- range on the left.  Knee, hip adduction and abduction are strong.  Hip flexion is impaired to a moderately severe degree.  There is relative preservation of knee extension and flexion and weakness of ankle dorsiflexion bilaterally.  She arrived today in a power wheelchair.  She has no cranial or bulbar deficits.  She is scheduled for pulmonary function studies later this afternoon.    Genetic testing was negative.    Ms. Danielson will meet with other members of our multidisciplinary care team today and return in 2-3 months.    Sincerely,    Andrzej Olmos MD    37 minutes spent on the date of the encounter on chart review, history and examination, documentation and further activities as noted above.    D: 2022   T: 2022   MT: fer    Name:     MAGO DANIELSON  MRN:      [id number]        Account:      [id number]   :      1970           Service Date: 2022       Document: [id number]

## 2022-08-18 NOTE — PROGRESS NOTES
Natural History of ALS and other motor neuron disorders  PI: Andrzej Olmos MD  : Katt Richard [phone]    Registry / Observational study    Patient was approached for possible participation for the Natural History of ALS and other motor neuron disorders registry. The current approved consent and authorization form was discussed and explained to the patient. It was discussed that involvement with the study is voluntary and refusal to participate would not involve penalty or decrease benefits at which the patient is entitled, and the subject may discontinue his/her involvement at any time without penalty or loss in benefits. The patient was given time to review and ask any questions about the consent. Patient verbalized understanding of the study. A copy of the signed consent was given to the patient for their records.     Subject Informed Consent and Authorization Form: SIGNED ON 8/1/2022

## 2022-08-18 NOTE — LETTER
2022       RE: Mago Danielson  [address]  [facility] [place] [address]     Dear Colleague,    Thank you for referring your patient, Mago Danielson, to the [facility] NEUROLOGY CLINIC [place] at [facility]. Please see a copy of my visit note below.    Service Date: 2022    Marley Barragan MD  [facility]  [address] [place]  [address]    RE:  Mago Danielson  MRN:  [id number]  :  1970    Dear Dr. Barragan:    Mago Danielson returned to the [facility] today for multidisciplinary care team management of ALS.  This is her first visit to our multidisciplinary clinic.  She reports relatively rapid progressive weakness and functional loss.  When I met her in May, she was using a walker and is no longer able to. She also reports worsening strength in the right upper limb.    Manual muscle testing demonstrates moderate weakness in the right upper extremity and moderate to severe weakness in the left upper extremity, roughly in the 4 to 4+ range on the right and 4 to 4- range on the left.  Knee, hip adduction and abduction are strong.  Hip flexion is impaired to a moderately severe degree.  There is relative preservation of knee extension and flexion and weakness of ankle dorsiflexion bilaterally.  She arrived today in a power wheelchair.  She has no cranial or bulbar deficits.  She is scheduled for pulmonary function studies later this afternoon.    Genetic testing was negative.    Ms. Danielson will meet with other members of our multidisciplinary care team today and return in 2-3 months.      Sincerely,    Andrzej Olmos MD      37 minutes spent on the date of the encounter on chart review, history and examination, documentation and further activities as noted above.        D: 2022   T: 2022   MT: fer    Name:     MAGO DANIELSON  MRN:       [id number]        Account:      [id number]   :      1970           Service Date: 2022       Document: [id number]

## 2022-10-12 NOTE — PROGRESS NOTES
Social Work -ALS Clinic Progress Note  [facility]    Data/Intervention:    Patient Name:  Mago Danielson  /Age:  1970 (52 year old)    Visit Type: telephone    Collaborated With:    -Balbina Olmos and members of the ALS interdisciplinary team    Psychosocial info/Concerns:   Balbina called and indicated she recently moved to [facility] in [place], [place]. She couldn't find any PCAs to care for her at home and her husb had to return to work to be able to pay the bills. She is planning to go home on the weekends.   She wanted to discuss transportation to get to the clinic for her visit next week. She cannot sit in her WC that long and also can't lay on a stretcher that long so would need to come both by stretcher and be able to move to her PWC while she is here.     Intervention/Education/Resources Provided:  Discussed that the stretcher transport vans cannot accommodate both a large PWC and a stretcher. She would like to have the visit by video. Her husb will be there and I suggested she also have a staff member from the facility. Let her know that we will change the visit to video and it will be at the same time.   She reports that she is getting some rehab at the facility for ROM and stretching.  She denied any breathing issues. She is wondering if she should get her PFTs done locally.    Assessment/Plan:  Handed off info above to Paty Dutta LPN and Dr Olmos re upcoming appts changes.     Provided patient/family with contact information and encouraged them to contact me between clinic visits as needed.     Marley Marie, ECHO, [facility]    [facility]  [phone]

## 2022-10-20 NOTE — LETTER
10/20/2022       RE: Mago Danielson  [address]  [place] [address]     Dear Colleague,    Thank you for referring your patient, Mago Danielson, to the [facility] NEUROLOGY CLINIC [place] at [facility]. Please see a copy of my visit note below.    [id number]      Service Date: 10/20/2022    Marley Barragan MD   [facility]  [address] [id number]    RE:  Mago Danielson  MRN: [id number]  : 1970    Dear Dr. Barragan:    I met with Mago Danielson via a brief telemedicine visit today to follow up on her ALS.  We focused principally on practical and functional needs.      Ms. Danielson does not feel a return to the [facility] is feasible.  She has developed progressive weakness.  She does demonstrate an unremarkable speech, normal tongue bulk, strength and coordination and normal facial strength.  She can raise her right upper limb to the horizontal at the shoulder and has less functional use of the left upper limb.    She also reports some left shoulder pain.    We have arranged the following:  I will fax orders for an orthopedic referral for evaluation of a probable frozen shoulder on the left.  I will fax orders for pulmonary function studies and would appreciate obtaining the results.  Of note, she has no current bulbar or ventilatory symptoms.  As she loses use her upper limbs, I think she would benefit from voice activated technology and augmentative communication.  I will speak with our colleague at the [employer], who is also a speech language pathologist, and ask her to speak with the patient.      Finally, her occupational therapist did suggest a speech therapy evaluation.  I think a baseline swallowing study is reasonable and will request this as well.      I will gladly see her in followup virtually in 3 months as well.    Sincerely,    Andrzej Olmos MD    15 minutes spent on the date of the encounter on  virtual visit.      D: 10/20/2022   T: 10/20/2022   MT: NELLIE    Name:     HERNAN ADAMPam  MRN:      [id number]        Account:      [id number]   :      1970           Service Date: 10/20/2022       Document: [id number]

## 2022-10-20 NOTE — Clinical Note
Please fax orthopedic and PFT orders to PCP office (or can fax ortho order to [facility] directly); please fax speech order to facility: [facility] [phone]

## 2022-10-20 NOTE — PATIENT INSTRUCTIONS
Orthopedic referral to [facility] for your left shoulder.  Baseline swallowing evaluation with speech therapy.  Pulmonary function testing at [facility].  Kerrie will call you about augmentative communication and voice-activated technology.    We will fax the swallow evaluation order to your facility and the other orders to Dr Barragan's office.

## 2022-10-20 NOTE — PROGRESS NOTES
Service Date: 10/20/2022    [place] [address]    RE:  Mago Danielson  MRN: [id number]  : 1970    Dear Dr. Barragan:    I met with Mago Danielson via a brief telemedicine visit today to follow up on her ALS.  We focused principally on practical and functional needs.      Ms. Danielson does not feel a return to the [facility] is feasible.  She has developed progressive weakness.  She does demonstrate an unremarkable speech, normal tongue bulk, strength and coordination and normal facial strength.  She can raise her right upper limb to the horizontal at the shoulder and has less functional use of the left upper limb.    She also reports some left shoulder pain.    We have arranged the following:  I will fax orders for an orthopedic referral for evaluation of a probable frozen shoulder on the left.  I will fax orders for pulmonary function studies and would appreciate obtaining the results.  Of note, she has no current bulbar or ventilatory symptoms.  As she loses use her upper limbs, I think she would benefit from voice activated technology and augmentative communication.  I will speak with our colleague at the [employer], who is also a speech language pathologist, and ask her to speak with the patient.      Finally, her occupational therapist did suggest a speech therapy evaluation.  I think a baseline swallowing study is reasonable and will request this as well.      I will gladly see her in followup virtually in 3 months as well.    Sincerely,    Andrzej Olmos MD    15 minutes spent on the date of the encounter on virtual visit.    D: 10/20/2022   T: 10/20/2022   MT: NELLIE    Name:     MAGO DANIELSON  MRN:      [id number]        Account:      [id number]   :      1970           Service Date: 10/20/2022       Document: [id number]

## 2022-10-20 NOTE — PROGRESS NOTES
Mago is a 52 year old who is being evaluated via a billable video visit.      How would you like to obtain your AVS? Mychart  If the video visit is dropped, the invitation should be resent by:[email or web address]  [phone]        Video-Visit Details    Video Start Time: 9:15    Type of service:  Video Visit    Video End Time:9:30    Originating Location (pt. Location): Home        Distant Location (provider location):  On-site    Platform used for Video Visit: zoom

## 2022-10-27 NOTE — TELEPHONE ENCOUNTER
Orders faxed to:   [facility] @ [phone],     PCP-Dr Marley Barragan @ [facility]-[phone] and [phone]     speech order to facility: [facility] @ [phone] [facility]

## 2022-12-30 NOTE — TELEPHONE ENCOUNTER
Patient has no follow up scheduled. She needs the followin. Hepatic panel. She is overdue for this.  2. Has not received Relyvrio BIF or a discussion of the option of using this medication.  3. OT had recommended a VFSS; I do not believe this was completed although it was ordered in October.      I called and left a message for her. I will ask our staff to call next week if she does not call back.  She did have an orthopedic appointment with a shoulder joint injection.  She did have a PFT. FVC was 85% predicted.

## 2023-01-01 ENCOUNTER — DOCUMENTATION ONLY (OUTPATIENT)
Dept: NEUROLOGY | Facility: CLINIC | Age: 53
End: 2023-01-01
Payer: MEDICARE

## 2023-01-01 ENCOUNTER — TRANSFERRED RECORDS (OUTPATIENT)
Dept: HEALTH INFORMATION MANAGEMENT | Facility: CLINIC | Age: 53
End: 2023-01-01
Payer: MEDICARE

## 2023-01-01 ENCOUNTER — VIRTUAL VISIT (OUTPATIENT)
Dept: NEUROLOGY | Facility: CLINIC | Age: 53
End: 2023-01-01
Payer: MEDICARE

## 2023-01-01 ENCOUNTER — ALLIED HEALTH/NURSE VISIT (OUTPATIENT)
Dept: NEUROLOGY | Facility: CLINIC | Age: 53
End: 2023-01-01

## 2023-01-01 ENCOUNTER — TELEPHONE (OUTPATIENT)
Dept: NEUROLOGY | Facility: CLINIC | Age: 53
End: 2023-01-01

## 2023-01-01 DIAGNOSIS — K76.9 LIVER DISEASE, UNSPECIFIED: ICD-10-CM

## 2023-01-01 DIAGNOSIS — G12.21 ALS (AMYOTROPHIC LATERAL SCLEROSIS) (H): Primary | ICD-10-CM

## 2023-01-01 PROCEDURE — 99215 OFFICE O/P EST HI 40 MIN: CPT | Mod: VID | Performed by: PSYCHIATRY & NEUROLOGY

## 2023-01-01 RX ORDER — GUAIFENESIN 200 MG/10ML
200 LIQUID ORAL
Qty: 1000 ML | Refills: 1 | Status: SHIPPED | OUTPATIENT
Start: 2023-01-01

## 2023-01-01 ASSESSMENT — REVISED AMYOTROPHIC LATERAL SCLEROSIS FUNCTIONAL RATING SCALE (ALSFRS-R)
HANDWRITING: UNABLE TO GRIP PEN
SPEECH: INTELLIGIBLE WITH REPEATING
FINE_MOTOR_SUBTOTAL_SCORE: 0
RESPIRATORY_SUBTOTAL_SCORE: 9
ALSFRS_TOTAL_SCORE: 18
BULBAR_SUBTOTAL: 8
CLIMBING_STAIRS: CANNOT DO
DRESSING_AND_HYGEINE: TOTAL DEPENDENCE
SIX_ITEM_SUBTOTAL: 5
WALKING: NON-AMBULATORY FUNCTIONAL MOVEMENT ONLY
CUTTING_FOOD_AND_HANDLING_UTENSILES: 0
SWALLOWING: 2
SWALLOWING: DIETARY CONSISTENCY CHANGES
GROSS_MOTOR_SUBTOTAL_SCORE: 1
DYSPNEA: 2
RESPIRATORY_INSUFFICIENCY: 4
SPEECH: 2
DYSPNEA: OCCURS WITH ONE OR MORE OF THE FOLLOWING: EATING, BATHING, DRESSING (ADL)
ORTHOPENA: 3
DRESSING_AND_HYGEINE: 0
WALKING: 1
SALIVATION: NORMAL
TURNING_IN_BED_AND_ADJUSTING_BED_CLOTHES: HELPLESS
ORTHOPENA: SOME DIFFICULTY SLEEPING AT NIGHT DUE TO SHORTNESS OF BREATH, DOES NOT ROUTINELY USE MORE THAN TWO PILLOWS
HANDWRITING: 0
TURNING_IN_BED_AND_ADJUSTING_BED_CLOTHES: 0
CLIMBING_STAIRS: 0
SALIVATION: 4

## 2023-01-02 NOTE — TELEPHONE ENCOUNTER
LVM for patient requesting call back, best number/time to reach her or asking if she would start a MyChart communication.

## 2023-01-02 NOTE — TELEPHONE ENCOUNTER
[facility]    Phone Message    May a detailed message be left on voicemail: yes     Reason for Call: Other: Pt is returning a call from [facility] and would like a call back at [phone] best time to get ahold of Pt anytime Pt stated.      Action Taken: Message routed to:  [facility] ([facility] Neurology    Travel Screening: Not Applicable

## 2023-02-14 NOTE — NURSING NOTE
Spoke with pt who reports she was able to see Ortho re: her shoulder and that shoulder pain is now occurring in her right shoulder. Pt had PFT since last visit, report in Epic. Swallow study completed, resulting in modified diet and thickened liquids. Nursing staff agreed to fax full swallow report.

## 2023-02-16 NOTE — PROGRESS NOTES
"CLINICAL NUTRITION SERVICES - ASSESSMENT NOTE    Mago Danielson 52 year old referred for MNT related to ALS    Visit Type: Initial  Referring Physician: Dr. Olmos  Pt accompanied by: self    NUTRITION HISTORY  Factors affecting nutrition intake include:swallowing difficulties and shortness of breathe  Current diet: Soft/pureed food, thickened liquids  Current appetite/intake: Reduced appetite, intake reduced    Diet Recall  Breakfast Cereal, milk, 1/2 banana, apple juice   Lunch Chicken leg, noodles, peas   Dinner Cheese omelet, hashbrown sae   Snacks N/A   Beverages water     ANTHROPOMETRICS  Height: 1.689 m (5' 6.5\")    Weight: 94.1 kg (207 lb 8 oz) - 8/18/22  Pt reported wt: 168 lb (76.4 kg) - 2/16/23  BMI: 27.1 kg/m2  Weight Status:  Overweight BMI 25-29.9  IBW: 59.1 kg  % IBW: 129%    Weight History: Pt had laparoscopic sleeve gastrectomy performed on 11/25/13. She reports a 39# (19%) weight loss over past 6 months.   Wt Readings from Last 10 Encounters:   08/18/22 94.1 kg (207 lb 8 oz)   05/25/22 102.1 kg (225 lb)   05/10/18 91.4 kg (201 lb 6.4 oz)   06/05/14 102.1 kg (225 lb)   08/05/13 (!) 141.3 kg (311 lb 8 oz)   06/11/13 (!) 145.7 kg (321 lb 3.4 oz)   06/10/13 (!) 145.2 kg (320 lb)   11/19/12 (!) 140.5 kg (309 lb 12.8 oz)   10/26/12 (!) 145.2 kg (320 lb)     Dosing Weight: 63 kg (adjusted based on 76.4 kg and 59.1 kg)    ALS FRS-6 (Modification of H-B equations for ALS)   BMR = 1450 Kcal/day   1 - Speech Score - 2   4 - Handwriting -  0   6 - Dressing and Hygiene -  0   7 - Turning in Bed - 0   8 - Walking - 1   10 a - Dyspnea - 2   Total ALS FRS 6 score =  5   Kcal needs per ALS- FRS = 1562       Medications/vitamins/minerals/herbals:   Reviewed    LABS  Labs reviewed    ASSESSED NUTRITION NEEDS:  Estimated Energy Needs: 1164-8183 kcals (25-30 Kcal/Kg)  Justification: increased needs associated with ALS  Estimated Protein Needs: 60-75 grams protein (1-1.2 g pro/Kg)  Justification: increased needs " associated with ALS  Estimated Fluid Needs: 1575  mL   Justification: maintenance    NUTRITION DIAGNOSIS:  Inadequate protein-energy intake related to decrease in appetite and increased needs associated with ALS as evidenced by 39# wt loss over past 6 months.    INTERVENTIONS  Recommendations / Nutrition Prescription  1. Continue Soft foods/pureed diet  2. Add 2 ONS/day    Nutrition Education     Provided written & verbal education on:   - Ways to maximize kcal and protein intake. Discussed calorie and protein needs for maintenance of weight and nutrition status.   - Discussed potential need for G-tube.  Described placement surgery and how it is used. Discussed formula and administration methods (gravity and bolus).  Discussed that this would be able to provide full nutrition prn.    - ONS (Ensure Enlive, Ensure Plus/Boost Plus, CIB, Benecalorie, Scandi shake etc). Suggested ways to incorporate these supplements to avoid flavor fatigue. Encouraged pt to start consuming 2 ONS daily.       Pt verbalize understanding of materials provided during consult.   Patient Understanding: Excellent  Expected Compliance: Excellent     Implementation  General/healthful diet and Medical Food Supplement    Goals  1.  Aim for 5-6 small frequent meals  2.  Aim for 1575 kcal and 60 g protein/day  3. Weight maintenance    Follow-Up Plans: Pt has RD contact information for questions.    Pt encouraged to follow up with RD at next clinic visit in 3-6 months.    MONITORING AND EVALUATION:  -Food intake  -Fluid/beverage intake  -Liquid meal replacement or supplement  -Weight trends    Soumya Sellers RDN, LD

## 2023-02-16 NOTE — PATIENT INSTRUCTIONS
"We discussed several things today:    Repeat pulmonary testing at [facility]. Fax results to [phone]    We will order nocturnal oximetry and send orders.  We will order a cough assist and send orders to [employer].    Look into getting a liquid form of the Mucinex (guafenisen)  After the pulmonary testing, I will provide a setting for the EMST device.  After the pulmonary testing, we will decide upon NIV (a breathing device at night) and a feeding tube.   I would not use the atropine drops for mucus. It would make it thicker and harder to mobilize.  Wear a bite guard at night.  I will order another hepatic panel and INR (blood tests). Fax results to [phone]    Paty will mail you a form to help with approval of Relyvrio, the new medication. Please fill out the \"patient section\" and return in envelope provided.  "

## 2023-02-16 NOTE — PROGRESS NOTES
Mago is a 52 year old who is being evaluated via a billable video visit.      How would you like to obtain your AVS? MyChart  If the video visit is dropped, the invitation should be resent by: Send to e-mail at: [email or web address]  Will anyone else be joining your video visit? No        Video-Visit Details    Type of service:  Video Visit       Originating Location (pt. Location): Home    Distant Location (provider location):  On-site  Platform used for Video Visit: Lisa

## 2023-02-16 NOTE — LETTER
2023       RE: Mago Danielson  [address]  [place], [place] [address]     Dear Colleague,    Thank you for referring your patient, Mago Danielson, to the [facility] [place] at [facility]. Please see a copy of my visit note below.      Service Date: 2023    Marley Barragan MD  [facility] [place]  [address], [place]  [address]    RE:     Mago Danielson  MRN:  [id number]  :      Dear Dr. Barragan:    I saw Mago Danielson today in followup via a video visit for her established diagnosis of ALS.  Ms. Danielson reports progressive disability.  She has limited use of 1 lower limb only.  She illustrates that she can bring her upper limbs near her face but has limited use of the hands.  She now has a detectable dysarthria, although her speech is about 90% comprehensible.  A recent swallowing study demonstrated some aspiration with mild oral dysphagia and moderate pharyngeal phase dysphagia.  She also continues to lose substantial weight.  She has difficulty sleeping, largely because of pain and restlessness, but reports it is different from restless leg syndrome.    Finally, she reports a very dry mouth but some excessive mucus.  She has purchased an EMST  but would like to know how to use it.  She does indicate that she would like to continue to be somewhat aggressive with life sustaining measures.    I have recommended the followin. I would like her to have a pulmonary function study now.    2. Nocturnal oximetry might be helpful as well.    3. We discussed a feeding tube and I would recommend this but would like to see what her pulmonary function is first.Because her protein and albumin have been consistently low on hepatic panels, she may need an extended hospital stay for gastrostomy tube to monitor for refeeding syndrome.  I will also ask that she have an INR done before the  procedure.   4. I will ask our dietitian today to speak to her about maintaining high calorie foods and to discuss the process of feeding tube placement.    5. She does have a Tobii Dynavox and I have inquired as to whether she has done voice banking.    6. I will have a repeat hepatic panel.     7. I will order a CoughAssist. This is medically necessary to manage secretions in this patient with a weak cough.   8. I have indicated that I do not think she should be on anticholinergics because of her dry mouth.  She would do better to stay well hydrated, use Mucinex, and uses CoughAssist to minimize her mucus.    9. She also would benefit from a bite guard.        I would be pleased to see her again in 6-8 weeks via virtual visit or in person.    Sincerely,    Andrzej Olmos MD    60 minutes spent on the date of the encounter on chart review, history and examination, documentation and further activities as noted above.        D: 2023   T: 2023   MT: fer    Name:     HERNAN ADAM  MRN:      [id number]        Account:      [id number]   :      1970           Service Date: 2023     Document: [id number]

## 2023-02-22 NOTE — PROGRESS NOTES
Service Date: 2023    Marley Barragan MD  [facility]  [address], [place]  [address]    RE:     Mago Danielson  MRN:  [id number]  :      Dear Dr. Barragan:    I saw Mago Danielson today in followup via a video visit for her established diagnosis of ALS.  Ms. Danielson reports progressive disability.  She has limited use of 1 lower limb only.  She illustrates that she can bring her upper limbs near her face but has limited use of the hands.  She now has a detectable dysarthria, although her speech is about 90% comprehensible.  A recent swallowing study demonstrated some aspiration with mild oral dysphagia and moderate pharyngeal phase dysphagia.  She also continues to lose substantial weight.  She has difficulty sleeping, largely because of pain and restlessness, but reports it is different from restless leg syndrome.    Finally, she reports a very dry mouth but some excessive mucus.  She has purchased an EMST  but would like to know how to use it.  She does indicate that she would like to continue to be somewhat aggressive with life sustaining measures.    I have recommended the followin. I would like her to have a pulmonary function study now.    2. Nocturnal oximetry might be helpful as well.    3. We discussed a feeding tube and I would recommend this but would like to see what her pulmonary function is first.Because her protein and albumin have been consistently low on hepatic panels, she may need an extended hospital stay for gastrostomy tube to monitor for refeeding syndrome.  I will also ask that she have an INR done before the procedure.   4. I will ask our dietitian today to speak to her about maintaining high calorie foods and to discuss the process of feeding tube placement.    5. She does have a Tobii Dynavox and I have inquired as to whether she has done voice banking.    6. I will have a repeat hepatic panel.     7. I will order a  CoughAssist. This is medically necessary to manage secretions in this patient with a weak cough.   8. I have indicated that I do not think she should be on anticholinergics because of her dry mouth.  She would do better to stay well hydrated, use Mucinex, and uses CoughAssist to minimize her mucus.    9. She also would benefit from a bite guard.        I would be pleased to see her again in 6-8 weeks via virtual visit or in person.    Sincerely,      Andrzej Olmos MD    60 minutes spent on the date of the encounter on chart review, history and examination, documentation and further activities as noted above.    D: 2023   T: 2023   MT: fer    Name:     HERNAN ADAM  MRN:      [id number]        Account:      [id number]   :      1970           Service Date: 2023       Document: [id number]

## 2023-03-08 NOTE — PROGRESS NOTES
Vital capacity reduced but safe for gastrostomy. Albumin and protein remain quite low. Will ask that PCP be consulted regarding markedly low values. Consider checking for proteinuria, reviewing dietary history, and consulting surgery regarding risk of gastrostomy in light of low values. Could be APR also.

## 2023-03-08 NOTE — PROGRESS NOTES
Marley Barragan MD at [facility]  Fax: [phone]    Phone: [phone] or [phone]

## 2023-03-20 NOTE — PROGRESS NOTES
Spoke with Annita, triage RN for Dr Barragan. We discussed her situation in detail. Ms Danielson is likely substantially malnurished (last weight 162), which explains the low protein and albumin. I feel she should have a gastrostomy. Can be done at [facility] or at [facility]; patient preference - as long as she is monitored closely by RD, feeds are started slowly and monitored for refeeding.     Plan:    1. Annita will speak with Ms Danielson about her preference.  2. Annita will speak with covering provider regarding albumin, protein, indication for further evaluation, and safety of procedure at [facility].  3. I will copy to our RD for her input.

## 2023-03-20 NOTE — PROGRESS NOTES
Left extensive voice mail message for Dr Barragan's team (MA for her PA, Selena Garcia, as no others were available) forwarding my concerns about protein and albumin and my contact numbers.

## 2023-03-27 NOTE — TELEPHONE ENCOUNTER
[facility] Call Center    Phone Message    May a detailed message be left on voicemail: yes     Reason for Call: Other: Annita of Dr. Lazcano office calling to let Dr. Olmos know that an order has been placed for a feeding tube to be done at [facility].  Call Annita if there are any questions.     Action Taken: Message routed to:  [facility] ([facility]    Travel Screening: Not Applicable